# Patient Record
Sex: MALE | Race: AMERICAN INDIAN OR ALASKA NATIVE | NOT HISPANIC OR LATINO | URBAN - METROPOLITAN AREA
[De-identification: names, ages, dates, MRNs, and addresses within clinical notes are randomized per-mention and may not be internally consistent; named-entity substitution may affect disease eponyms.]

---

## 2022-12-21 ENCOUNTER — TRANSFERRED RECORDS (OUTPATIENT)
Dept: HEALTH INFORMATION MANAGEMENT | Facility: CLINIC | Age: 37
End: 2022-12-21

## 2022-12-22 ENCOUNTER — HOSPITAL ENCOUNTER (INPATIENT)
Facility: HOSPITAL | Age: 37
LOS: 11 days | Discharge: PSYCHIATRIC OR SUBSTANCE USE FACILITY | End: 2023-01-02
Attending: PSYCHIATRY & NEUROLOGY | Admitting: STUDENT IN AN ORGANIZED HEALTH CARE EDUCATION/TRAINING PROGRAM
Payer: COMMERCIAL

## 2022-12-22 DIAGNOSIS — F25.1 SCHIZOAFFECTIVE DISORDER, DEPRESSIVE TYPE (H): Primary | ICD-10-CM

## 2022-12-22 PROCEDURE — 250N000013 HC RX MED GY IP 250 OP 250 PS 637: Performed by: PSYCHIATRY & NEUROLOGY

## 2022-12-22 PROCEDURE — 250N000013 HC RX MED GY IP 250 OP 250 PS 637: Performed by: NURSE PRACTITIONER

## 2022-12-22 PROCEDURE — 99222 1ST HOSP IP/OBS MODERATE 55: CPT | Performed by: NURSE PRACTITIONER

## 2022-12-22 PROCEDURE — 124N000001 HC R&B MH

## 2022-12-22 RX ORDER — OLANZAPINE 10 MG/2ML
10 INJECTION, POWDER, FOR SOLUTION INTRAMUSCULAR 3 TIMES DAILY PRN
Status: DISCONTINUED | OUTPATIENT
Start: 2022-12-22 | End: 2023-01-02 | Stop reason: HOSPADM

## 2022-12-22 RX ORDER — OLANZAPINE 10 MG/1
10 TABLET ORAL 3 TIMES DAILY PRN
Status: DISCONTINUED | OUTPATIENT
Start: 2022-12-22 | End: 2023-01-02 | Stop reason: HOSPADM

## 2022-12-22 RX ORDER — OLANZAPINE 20 MG/1
20 TABLET ORAL AT BEDTIME
Status: ON HOLD | COMMUNITY
End: 2022-12-29

## 2022-12-22 RX ORDER — IBUPROFEN 400 MG/1
400 TABLET, FILM COATED ORAL EVERY 6 HOURS PRN
Status: DISCONTINUED | OUTPATIENT
Start: 2022-12-22 | End: 2022-12-23

## 2022-12-22 RX ORDER — BENZTROPINE MESYLATE 2 MG/1
TABLET ORAL
Status: ON HOLD | COMMUNITY
End: 2022-12-29

## 2022-12-22 RX ORDER — CHOLECALCIFEROL (VITAMIN D3) 50 MCG
1 TABLET ORAL AT BEDTIME
Status: ON HOLD | COMMUNITY
End: 2022-12-29

## 2022-12-22 RX ORDER — PROPRANOLOL HYDROCHLORIDE 20 MG/1
20 TABLET ORAL AT BEDTIME
Status: ON HOLD | COMMUNITY
End: 2022-12-29

## 2022-12-22 RX ORDER — DIPHENHYDRAMINE HCL 50 MG
50 CAPSULE ORAL AT BEDTIME
Status: ON HOLD | COMMUNITY
End: 2022-12-29

## 2022-12-22 RX ORDER — FERROUS SULFATE 325(65) MG
325 TABLET, DELAYED RELEASE (ENTERIC COATED) ORAL AT BEDTIME
Status: ON HOLD | COMMUNITY
End: 2022-12-29

## 2022-12-22 RX ADMIN — OLANZAPINE 10 MG: 10 TABLET, FILM COATED ORAL at 20:18

## 2022-12-22 RX ADMIN — IBUPROFEN 400 MG: 400 TABLET ORAL at 20:19

## 2022-12-22 ASSESSMENT — ACTIVITIES OF DAILY LIVING (ADL)
CHANGE_IN_FUNCTIONAL_STATUS_SINCE_ONSET_OF_CURRENT_ILLNESS/INJURY: NO
DRESSING/BATHING_DIFFICULTY: NO
ADLS_ACUITY_SCORE: 41
ADLS_ACUITY_SCORE: 43
ADLS_ACUITY_SCORE: 31
ADLS_ACUITY_SCORE: 31
ORAL_HYGIENE: INDEPENDENT
TOILETING_ISSUES: NO
DOING_ERRANDS_INDEPENDENTLY_DIFFICULTY: NO
HYGIENE/GROOMING: INDEPENDENT
DRESS: SCRUBS (BEHAVIORAL HEALTH);INDEPENDENT
HEARING_DIFFICULTY_OR_DEAF: NO
DIFFICULTY_COMMUNICATING: NO
FALL_HISTORY_WITHIN_LAST_SIX_MONTHS: NO
ADLS_ACUITY_SCORE: 31
DIFFICULTY_EATING/SWALLOWING: NO
WALKING_OR_CLIMBING_STAIRS_DIFFICULTY: NO
VISION_MANAGEMENT: GLASSES
WEAR_GLASSES_OR_BLIND: YES
CONCENTRATING,_REMEMBERING_OR_MAKING_DECISIONS_DIFFICULTY: YES

## 2022-12-22 NOTE — CARE PLAN
Prior to Admission Medication Reconciliation:     Medications added:   [] None  [x] As listed below:    All meds in PTA meds    Medications deleted:   [x] None  [] As listed below:    Medications marked for review/removal by attending:  [x] None  [] As listed below:    Changes made to existing medications:   [x] None  [] Updated time of day, strengths and frequencies to most current.     Pertinent notes/medications patient takes different than prescribed:     Patient reports he last took all meds 4-5 days ago. Today in the ED at the transferring facility he was given Olanzapine and propranolol doses.       Patient's medical facility and pharmacy are closed until Tuesday. PTA med review completed with pt personal  bottles and patient report only.     Last times/dates taken verified with patient:  [x] Yes- completed myself   [] Nurse completed, no changes made (double checked entries)  [] Unable to review with patient at this time:    Allergy review:    [x]Did not review: reviewed by nursing  []Allergies reviewed and updated if needed.     Medication reconciliation sources:   [x]Patient: patient listed off medications and confirmed taking as prescribed. Did not know names of cogentin or zyprexa but knew what they were for.   []Patient family member/emergency contact: **  [][facility] Report Review  [x]Epic Chart Review  [x]Care Everywhere review  [x]Pharmacy med list/phone call: [facility]- pharmacy and clinic closed until Tuesday 12/27/22  []Fill dates reflect compliancy. No concerns.  []Fill dates do not reflect compliancy on the following medications:   []Outside meds dispense report:   []Fill dates reflect compliancy. No concerns.  []Fill dates do not reflect compliancy on the following medications:   []HomeCare medlist, Nursing home or Assisted Living MAR:  []Behavioral Health Provider:  [x]Other: medication bottles patient had in personal belongings    Cogentin 2 mg 1 tab AM, 2 tabs HS 2  bottles    Propranolol 20 mg daily 2 bottles    Olanzapine 20 mg at bedtime 11/9/22 30 tabs    Benadryl 50 mg at bedtime 11/9/22 30 units    Ferrous sulfate 325 mg daily 11/9/22 30 units    Vit D 50 mcg daily 11/9/22 30 units     Pharmacy desired at discharge: [facility]    Is patient on coumadin?   [x]No  []Yes      Fill dates and reported compliancy:  [x]  Pt reports recent non-compliancy.   [] Not applicable. Patient is not taking any prescribed maintenance medications at this time.   [] Fill dates do not coincide with compliancy, but reports compliancy.    [] Fill dates reflect compliancy, but reports non-compliancy.   [] Cannot assess at this time.     Historian accuracy:  [] Excellent- alert and oriented, understands why meds were prescribed and how to take, able to answer specifics  [x] Good- alert and oriented, understands why meds were prescribed and how to take, some confusion   [] Fair- alert and oriented, doesn't know medications without list, cannot answer specifics about medications, but has a decent process for which to take at home  [] Poor- does not know medications, may not have a process to take at home, may be cognitively unable to review at this time  []Medication management done by family member or facility, no concerns about historian accuracy.   [] Cannot assess at this time.     Medication Management:  [x] Manages meds independently  [] Family member/ other party manages meds/assists:  [] Meds managed by staff at facility  [] Meds set up by home care, family/other party helps administer  [] Meds set up by home care, self administers  [] Cannot assess at this time.     Other medications aside from PTA:  [x] Denies taking any other medications aside from those listed in PTA meds, this includes over-the-counter vitamins, supplements and analgesics.   [] Unable to confirm at this time.     Hermelinda Sosa on 12/22/2022 at 2:30 PM     Notifying appropriate party of  changes/additions/discrepancies:  []No pertinent changes made, notification not necessary.   [] Notified attending provider via text page/phone call/sticky note or other:  [] Notified other:  [x] Medications have not been reconciled by a provider yet, notification not necessary  [] Pt is not admitted to floor yet or patient is boarding, PTA meds completed before admission.     Medications Prior to Admission   Medication Sig Dispense Refill Last Dose     benztropine (COGENTIN) 2 MG tablet Take 1 tablet (2 mg) by mouth once daily in the morning, Take 2 tablets (4 mg) at bedtime.   Past Week at 4-5 days ago     diphenhydrAMINE (BENADRYL) 50 MG capsule Take 50 mg by mouth At Bedtime   Past Week at 4-5 days ago     ferrous sulfate (FE TABS) 325 (65 Fe) MG EC tablet Take 325 mg by mouth At Bedtime   Past Week at 4-5 days ago     medical cannabis (Patient's own supply) Take as directed by prescribing facility.   Past Week at 2-3 days ago     OLANZapine (ZYPREXA) 20 MG tablet Take 20 mg by mouth At Bedtime   Past Week at 4-5 days ago     propranolol (INDERAL) 20 MG tablet Take 20 mg by mouth At Bedtime   Past Week at 4-5 days ago     vitamin D3 (CHOLECALCIFEROL) 50 mcg (2000 units) tablet Take 1 tablet by mouth At Bedtime   Past Week at 4-5 days ago
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
## 2022-12-22 NOTE — PROGRESS NOTES
"   12/22/22 1537   Patient Belongings   Did you bring any home meds/supplements to the hospital?  Yes   Disposition of meds  Sent to security/pharmacy per site process   Patient Belongings remains with patient;locker;sent to security per site process   Patient Belongings Remaining with Patient body jewelry;earrings;glasses   Patient Belongings Put in Hospital Secure Location (Security or Locker, etc.) cash/credit card;bracelet;cell phone/electronics;clothing;medical/assistive equipment;medication(s);necklace;shoes   Belongings Search Yes   Clothing Search Yes   Second Staff Charlie   Comment cane, blue tank top, blue underwear, colorful socks, flannel pants, flannel lined jeans, belt, black anf gray ripped sweatshirt, gray long sleeve shirt, boots, 2 lighters, gray UA jacket, feather tip writing kit, black milacs sweatshirt, black back pack, [place] zip up, flashlight, blue folder with all court/legal documents (wouldnt fit to go to safe), gray and black hoody, blue head phones, beaded cross necklace, reddish sweatshirt, remaining with patient : glasses, 2 earrings, eye brow ring     List items sent to safe: [place] card, very smashed Moprise ipad, 12grand casino cards, holiday gift card, 2 coins in a black box, covid vaccine card, Rx card, 2 net spend cards, pay pal card, jesús visa debit, sesame mastercard, [facility] bank debit card, walmart mastercard, [facility] bank visa debit, [facility] LEWIS card, current visa card, discover card, prepaid visa debit, [place] debit, ebay card, walgreens card, kemal yan mastercard, 3 visa cards with different names, copy of $890 check, [place] enrollment document, \"her beast\" bracelet, blue air pods, white earbuds    SECURITY WAS GIVEN A POCKET KNIFE AND MARIJUANA PIPE    All other belongings put in assigned cubby in belongings room.       I have reviewed my belongings list on admission and verify that it is correct.     Patient signature_______________________________    [facility]" witness (if patient unable to sign) ______________________________       I have received all my belongings at discharge.    Patient signature________________________________    Viri Palafox  12/22/2022  3:42 PM

## 2022-12-22 NOTE — PLAN OF CARE
ADMISSION NOTE    Reason for admission auditory and visual hallucinations telling him to harm self..  Safety concerns fall risk.  Risk for or history of violence risk for.   Full skin assessment: completed, no open areas, brusies, patient has a skin condition phoresis.     Patient arrived on unit from [facility] accompanied by security and EMS on 12/22/2022  1421 PM.   Status on arrival: alert, calm, cooperative, orientated.   Patient given tour of unit and Welcome to  unit papers given to patient, wanding completed, belongings inventoried, and admission assessment completed.   Patient's legal status on arrival is voluntary. Appropriate legal rights discussed with and copy given to patient. Patient Bill of Rights discussed with and copy given to patient.   Patient denies SI, HI, and thoughts of self harm and contracts for safety while on unit.      Charlie Bennett RN  12/22/2022  2:53 PM

## 2022-12-23 LAB
ALBUMIN SERPL BCG-MCNC: 3.8 G/DL (ref 3.5–5.2)
ALP SERPL-CCNC: 95 U/L (ref 40–129)
ALT SERPL W P-5'-P-CCNC: 18 U/L (ref 10–50)
ANION GAP SERPL CALCULATED.3IONS-SCNC: 12 MMOL/L (ref 7–15)
AST SERPL W P-5'-P-CCNC: 21 U/L (ref 10–50)
BILIRUB SERPL-MCNC: 0.7 MG/DL
BUN SERPL-MCNC: 10.9 MG/DL (ref 6–20)
CALCIUM SERPL-MCNC: 9.3 MG/DL (ref 8.6–10)
CHLORIDE SERPL-SCNC: 103 MMOL/L (ref 98–107)
CREAT SERPL-MCNC: 1.22 MG/DL (ref 0.67–1.17)
DEPRECATED HCO3 PLAS-SCNC: 26 MMOL/L (ref 22–29)
GFR SERPL CREATININE-BSD FRML MDRD: 78 ML/MIN/1.73M2
GLUCOSE SERPL-MCNC: 95 MG/DL (ref 70–99)
HOLD SPECIMEN: NORMAL
HOLD SPECIMEN: NORMAL
POTASSIUM SERPL-SCNC: 3.5 MMOL/L (ref 3.4–5.3)
PROT SERPL-MCNC: 7.2 G/DL (ref 6.4–8.3)
SODIUM SERPL-SCNC: 141 MMOL/L (ref 136–145)

## 2022-12-23 PROCEDURE — 250N000013 HC RX MED GY IP 250 OP 250 PS 637: Performed by: PSYCHIATRY & NEUROLOGY

## 2022-12-23 PROCEDURE — 124N000001 HC R&B MH

## 2022-12-23 PROCEDURE — 80053 COMPREHEN METABOLIC PANEL: CPT | Performed by: NURSE PRACTITIONER

## 2022-12-23 PROCEDURE — 250N000013 HC RX MED GY IP 250 OP 250 PS 637: Performed by: NURSE PRACTITIONER

## 2022-12-23 PROCEDURE — 99223 1ST HOSP IP/OBS HIGH 75: CPT | Mod: AI | Performed by: PSYCHIATRY & NEUROLOGY

## 2022-12-23 PROCEDURE — 36415 COLL VENOUS BLD VENIPUNCTURE: CPT | Performed by: NURSE PRACTITIONER

## 2022-12-23 RX ORDER — OLANZAPINE 10 MG/1
10 TABLET ORAL AT BEDTIME
Status: DISCONTINUED | OUTPATIENT
Start: 2022-12-23 | End: 2022-12-25

## 2022-12-23 RX ORDER — OLANZAPINE 2.5 MG/1
2.5 TABLET, FILM COATED ORAL 2 TIMES DAILY PRN
Status: DISCONTINUED | OUTPATIENT
Start: 2022-12-23 | End: 2022-12-30

## 2022-12-23 RX ORDER — IBUPROFEN 600 MG/1
600 TABLET, FILM COATED ORAL EVERY 6 HOURS PRN
Status: DISCONTINUED | OUTPATIENT
Start: 2022-12-23 | End: 2022-12-23

## 2022-12-23 RX ORDER — IBUPROFEN 400 MG/1
400 TABLET, FILM COATED ORAL EVERY 6 HOURS PRN
Status: DISCONTINUED | OUTPATIENT
Start: 2022-12-23 | End: 2022-12-29

## 2022-12-23 RX ADMIN — OLANZAPINE 10 MG: 10 TABLET, FILM COATED ORAL at 20:20

## 2022-12-23 RX ADMIN — IBUPROFEN 400 MG: 400 TABLET ORAL at 20:20

## 2022-12-23 ASSESSMENT — ACTIVITIES OF DAILY LIVING (ADL)
ADLS_ACUITY_SCORE: 31
HYGIENE/GROOMING: INDEPENDENT
ADLS_ACUITY_SCORE: 31
ADLS_ACUITY_SCORE: 31
ORAL_HYGIENE: INDEPENDENT
DRESS: SCRUBS (BEHAVIORAL HEALTH);INDEPENDENT
ADLS_ACUITY_SCORE: 31

## 2022-12-23 NOTE — PLAN OF CARE
Face to face shift report received from nurse. Rounding completed, pt observed.    Problem: Adult Behavioral Health Plan of Care  Goal: Patient-Specific Goal (Individualization)  Patient will sleep 5-8 hours a night.   Patient will be medication and treatment team compliant.   Patient will be free from falls and injury during hospitalization..  12/23/2022 1431 by Charlie Bennett RN  Outcome: Progressing  Patient has been calm and cooperative through the shift.  Patient mostly keeps to himself, comes out for meals and reads a book most of the day.    Problem: Suicidal Behavior  Goal: Suicidal Behavior is Absent or Managed  12/23/2022 1431 by Charlie Bennett RN  Outcome: Progressing     Face to face report will be communicated to oncoming RN.    Charlie POLO. RANDY Bennett  12/23/2022

## 2022-12-23 NOTE — PLAN OF CARE
"Social Service Psychosocial Assessment  Presenting Problem:   Patient came into the ED with psychosis, command AH/VH telling him to harm or kill himself.  Marital Status:     Spouse / Children:    3 Kids who live in [place] with their mom - he talks to them every so often  Psychiatric TX HX:   He reports he has been hospitalized in the past, last suicide attempt was 3 years ago, last time cutting was 2 years ago.  He does have many scars on his arms from cutting.  He does report that he has heard voices most of his life - everyone thought it was his \"invisible friends\".  He states it became a problem when he was [age].  He reports he gets his services through the [facility] - states he has been with is med provider for about a year and has never met with him -just gets refills.  Thinks he may need more services.  Suicide Risk Assessment:  He reports having some SI on admit and has a history of suicide attempts - last attempt was 3 years ago.  He denies having any SI today.  Access to Lethal Means (explain):   Denies  Family Psych HX:   Unknown  A & Ox:   x3  Medication Adherence:   Unknown  Medical Issues:   See H&P  Visual -Motor Functioning:   Good  Communication Skills /Needs:   Good  Ethnicity:    or      Spirituality/Gnosticist Affiliation:   None reported   Clergy Request:   No   History:   None  Living Situation:   Currently lives with his mom in [place].  Reports he has been living with her for a while - reports she is not a great support for him and they do not always get along well.  ADL s:  Independent   Education:  Graduated HS  Financial Situation:   Is applying for Disability - reports he currently does not have an income  Occupation:  Reports he has done many jobs in the past.  Leisure & Recreation:  Unknown  Childhood History:   Reports he was born in [place] - grew up with mom, aunts, uncles, cousin - moved to [place] when he was 9.  Has one 1/2 " brother.  Reports he moved around a lot as a kid.  Also came to [place] off and on until 2006 when he moved here.  Trauma Abuse HX:   None reported  Relationship / Sexuality:   Denies  Substance Use/ Abuse:   Has cirrhosis from alcohol abuse.  Reports he smokes pot daily and drinks some alcohol - reports he does not think his chemical use is problematic at this point.  Chemical Dependency Treatment HX:   Reports he has gone to inpatient CD tx in 2016 - thought it was helpful.  Legal Issues:   Reports he is currently on probation for Sales and transporting - did about a year in prison and thinks his probation ends soon.  Significant Life Events:   None reported  Strengths:   Has some supports, is open to more serivces  Challenges /Limitation:   Hallucinations, med changes  Patient Support Contact (Include name, relationship, number, and summary of conversation):       Interventions:       Community-Based Programs - might benefit from [facility] worker    Medical/Dental Care - PCP?    Medication Management - sees someone through the [facility]    Individual Therapy - ?    Financial Assistance - applying for Disability     Suicide Risk Assessment -He reports having some SI on admit and has a history of suicide attempts - last attempt was 3 years ago.  He denies having any SI today.      High Risk Safety Plan - Talk to supports; Call crisis lines; Go to local ER if feeling suicidal.

## 2022-12-23 NOTE — PLAN OF CARE
"  Problem: Adult Behavioral Health Plan of Care  Goal: Patient-Specific Goal (Individualization)  Description: You can add care plan individualizations to a care plan. Examples of Individualization might be:  \"Parent requests to be called daily at 9am for status\", \"I have a hard time hearing out of my right ear\", or \"Do not touch me to wake me up as it startles me\".  Outcome: Progressing  Note:     1820 Patient is in his room and calm and cooperative. Patient states that he is a little depressed but denies suicidal thoughts. Patient admits to hearing voices and feeling anxious. Requests medication for both. Patient ate well for supper meal. Steady on his feet. Appropriate interaction with peers.     2130 Patient medicated with Zyprexa 10 mg po for voices and Ibuprofen 400 mg po for chronic back pain.    Face to face end of shift report communicated to 11-7 shift RN.     Vilma Fong RN  12/22/2022  9:49 PM           Goal Outcome Evaluation:                        "

## 2022-12-23 NOTE — PLAN OF CARE
"Report received from outgoing staff.    Problem: Adult Behavioral Health Plan of Care  Goal: Patient-Specific Goal (Individualization)  Description: You can add care plan individualizations to a care plan. Examples of Individualization might be:  \"Parent requests to be called daily at 9am for status\", \"I have a hard time hearing out of my right ear\", or \"Do not touch me to wake me up as it startles me\".  Outcome: Not Progressing     Problem: Suicidal Behavior  Goal: Suicidal Behavior is Absent or Managed  Outcome: Not Progressing    Rounds completed. Safety checks completed every 15 minutes throughout the night. Pt noted to be resting with eyes closed and easy resp. for 7 hours. No suicidal gestures or comments noted.    Pt was pleasant and cooperative with lab draw this morning.    Face to face end of shift report to be communicated to oncoming RN.     "

## 2022-12-23 NOTE — H&P
"  [facility] PSYCHIATRY  -  HISTORY AND PHYSICAL     ADMISSION DATA     Shalom Castaneda MRN# [id number]   Age: 37 year old YOB: 1985     Date of Admission: 12/22/2022  Primary Physician: No primary care provider on file.   Referral Area: Referral Area: Outside Emergency Department, [facility]        CHIEF COMPLAINT   Reason for Admit/Consult: Suicidal ideation or attempt / self harm and Psychosis / jazmin symptoms       HISTORY OF PRESENT ILLNESS   Shalom Castaneda is a 37 year old male, single,  ([place]) with a past psychiatric history notable for schizoaffective disorder. Currently living in [place], [place] with his mother, 2 dogs and 2 cats.  He is unemployed, last working 1 year ago at [employer] grounds.  Smoking 1/2 bowl of cannabis daily.  Off medications secondary to difficulties with refills. Self-presents to outside emergency department on a voluntary basis with worsening depressive symptoms, worsening command AVH instructing him to kill or harm himself. Patient was subsequently transferred and accepted for inpatient psychiatric hospitalization at [facility] for further safety and further stabilization     On psychiatric interview, he is met within his room. He is calm, pleasant and engaging. No barriers to participation noted. He is deemed a reliable historian. He reports he is in the hospital because \"my schizoaffective is acting up\".  He reports that he has been off his medications for some time as he has not been able to get refills. He reports he is not connected with his outpatient supports and is requesting to get back on medications. He reports both auditory and visual hallucinations. Reports seeing shadows that he can't make out. He reports auditory hallucinations of several voices that at times are telling him to harm or kill himself. He states that he has been on olanzapine in the past and it is helpful for " reducing the intensity and frequency of his voices. He reports he is smoking cannabis daily. He reports he drinks 2 cans of beer daily.   Reports his mood has been down more lately. He is worried that he will act on the voices.     Explained the side effects, benefits and complication of olanzapine. He gives verbal consent.      REVIEW OF PSYCHIATRIC SYSTEMS   I do not elicit symptoms consistent with jazmin, generalized anxiety, agoraphobia, social anxiety, panic disorder, OCD, ADHD, an eating disorder and pain     REVIEW OF MEDICAL SYSTEMS   14-point medical review of systems is negative other than noted in the HPI.     PSYCHIATRIC HISTORY   Prior psychiatric diagnoses: schizoaffective diosrder  Psychiatric Hospitalizations: denies  Chemical Dependency Treatments: yes  Prior Psychiatric Prescriber: denies  Prior/Current Therapist: denies  Past Psychotropic Medication Trials: he can't recall     FAMILY HISTORY   No family history on file.      SUBSTANCE USE HISTORY   History   Drug Use Not on file       Social History    Substance and Sexual Activity      Alcohol use: Not on file      History   Smoking Status     Not on file   Smokeless Tobacco     Not on file     2 cans of beer a day  Smokes cannabis daily        SOCIAL HISTORY   Social History     Socioeconomic History     Marital status:      Spouse name: Not on file     Number of children: Not on file     Years of education: Not on file     Highest education level: Not on file   Occupational History     Not on file   Tobacco Use     Smoking status: Not on file     Smokeless tobacco: Not on file   Substance and Sexual Activity     Alcohol use: Not on file     Drug use: Not on file     Sexual activity: Not on file   Other Topics Concern     Not on file   Social History Narrative     Not on file     Social Determinants of Health     Financial Resource Strain: Not on file   Food Insecurity: Not on file   Transportation Needs: Not on file   Physical Activity:  "Not on file   Stress: Not on file   Social Connections: Not on file   Intimate Partner Violence: Not on file   Housing Stability: Not on file     Lives with mom in [place], [place]       PAST MEDICAL HISTORY   No past medical history on file.    No past surgical history on file.    Amoxicillin, Clindamycin, and Penicillins     PHYSICAL EXAM   I have reviewed the physical exam as documented by the medical team (Valentin 12/22/22) and agree with findings and assessment and have no additional findings to add at this time.     VITALS   Vitals: /69   Pulse 63   Temp 97.3  F (36.3  C) (Temporal)   Resp 16   Ht 1.803 m (5' 11\")   Wt 72.4 kg (159 lb 9.6 oz)   SpO2 98%   BMI 22.26 kg/m       MENTAL STATUS EXAM   Appearance:  awake, alert, broken glasses, unshaven  Attitude:  cooperative  Eye Contact:  good  Mood:  depressed  Affect:  mood congruent  Speech:  clear, coherent  Psychomotor Behavior:  no evidence of tardive dyskinesia, dystonia, or tics  Thought Process:  logical and linear  Associations:  no loose associations  Thought Content:  auditory hallucinations present  Insight:  fair  Judgment:  fair  Oriented to:  time, person, and place  Attention Span and Concentration:  limited  Recent and Remote Memory:  intact  Gait and Station: Normal       LABS   No results found for this or any previous visit (from the past 24 hour(s)).      ASSESSMENT   Summary: Shalom Castaneda is a 37 year old male, single,  ([place]) with a past psychiatric history notable for schizoaffective disorder. Currently living in [place], [place] with his mother, 2 dogs and 2 cats.  He is unemployed, last working 1 year ago at [employer] grounds.  Smoking 1/2 bowl of cannabis daily.  Off medications secondary to difficulties with refills. Self-presents to outside emergency department on a voluntary basis with worsening depressive symptoms, worsening command AVH instructing him to kill or harm himself. Patient was subsequently " transferred and accepted for inpatient psychiatric hospitalization at [facility] for further safety and further stabilization     Etiology of patient's presentation today is consistent with schizoaffective disorder, depressed type. Presenting with SI in the setting of command AV. Also endorsing VH.  Off medications. Will resume olanzapine as this has helped him in the past. Will explore his connections to outpatient services. Voluntary but holdable.     Explained the side effects, benefits and complications of neuroleptic medications. Retains capacity to consent. Consents to initiation during hospitalization.        DIAGNOSTIC FORMULATION   #Schizoaffective Disorder, depressed type  #cannabis use disorder, severe  #alcohol abuse versus use disorder     PLAN   Admit patient to [facility], Location: Unit 5     Legal Status: None    Safety Assessment:    Behavioral Orders   Procedures     Code 1 - Restrict to Unit     Routine Programming     As clinically indicated     Status 15     Every 15 minutes.      Imminent risk of harm in a setting less restrictive than an inpatient psychiatric facility is determined to be medium to high.       PTA medications held:   -propranolol  -cogentin    PTA medications continued/changed:   -olanzapine     New medications initiated:   - none    Programming: Patient will be treated in a therapeutic milieu with appropriate individual and group therapies. Education will be provided on diagnoses, medications, and treatments.     Medical diagnoses:  Per medicine    Diagnostic studies and consultations:  No additional studies or tests recommended on admission.      Level of care required: Acute psychiatric hospitalization    Justification for hospitalization and estimated length of stay: reasons for hospitalization include potential safety risk to self or others within the last week, decreased functioning in outpatient setting and in  the setting of no outpatient management, need for highly structured inpatient management for stabilization of psychiatric symptoms, need for psychiatric medication initiation and stabilization.  Estimated length of stay is 4-7 days.      Total time: 80 minutes       ATTESTATION    Wade Jaime MD  [facility]   Psychiatry    Video Visit: Patient has given verbal consent for video visit?: Yes  Type of Service: video visit for mental health treatment  Reason for Video Visit: COVID-19 and [place]  Originating Site (patient location): [facility]  Distant Site (provider location): Remote Location  Mode of Communication: Video Conference via [employer]  Time of Service: Date: December 23, 2022 , Start: 08:00 end: 09:00

## 2022-12-23 NOTE — DISCHARGE INSTRUCTIONS
Behavioral Discharge Planning and Instructions    Summary:  Patient came into the ED with psychosis, command AH/VH telling him to harm or kill himself.    Main Diagnosis: #Schizoaffective Disorder, depressed type  #cannabis use disorder, severe  #alcohol abuse versus use disorder    Health Care Follow-up:     [facility]  PCP: Juliet Friday January 6th @ 1:00 PM   [address] [place] [address]  Phone: [phone]    Referrals:    [facility]  All IRTS under the umbrella  [facility]- [phone]  Fax: [phone]          Attend all scheduled appointments with your outpatient providers. Call at least 24 hours in advance if you need to reschedule an appointment to ensure continued access to your outpatient providers.     Major Treatments, Procedures and Findings:  You were provided with: a psychiatric assessment, assessed for medical stability, medication evaluation and/or management, group therapy, and milieu management    Symptoms to Report: feeling more aggressive, increased confusion, losing more sleep, mood getting worse, or thoughts of suicide    Early warning signs can include: increased depression or anxiety sleep disturbances increased thoughts or behaviors of suicide or self-harm  increased unusual thinking, such as paranoia or hearing voices    Safety and Wellness:  Take all medicines as directed.  Make no changes unless your doctor suggests them.      Follow treatment recommendations.  Refrain from alcohol and non-prescribed drugs.  If there is a concern for safety, call 911.    Resources:   Crisis Intervention: 860.727.5993 or 962-055-0264 (TTY: 484.412.3426).  Call anytime for help.  National [place] on Mental Illness (www.[place].ra[place]h.org): 403.378.5823 or 899-323-3865.  [place] Association for Children's Mental Health (www.macmh.org): 669.475.7530.  Alcoholics Anonymous (www.alcoholics-anonymous.org): Check your phone book for your local chapter.  Suicide Awareness Voices of Education  "(SAVE) (www.save.org): 541-369-RXBV (7283)  National Suicide Prevention Line (www.mentalhealthmn.org): 237-950-MFWZ (7172)  Mental Health Consumer/Survivor Network of [place] (www.mhcsn.net): 469.772.2023 or 820-817-7964  Mental Health Association of [place] (www.mentalhealth.org): 260.943.4735 or 889-352-2314  Self- Management and Recovery Training., SMART-- Toll free: 756.910.9721  www.Moodsnap  Text 4 Life: txt \"LIFE\" to 18035 for immediate support and crisis intervention  Crisis text line: Text \"[place]\" to 919751. Free, confidential, 24/7.  Crisis Intervention: 400.962.2802 or 214-497-8607. Call anytime for help.     General Medication Instructions:   See your medication sheet(s) for instructions.   Take all medicines as directed.  Make no changes unless your doctor suggests them.   Go to all your doctor visits.  Be sure to have all your required lab tests. This way, your medicines can be refilled on time.  Do not use any drugs not prescribed by your doctor.  Avoid alcohol.    Advance Directives:   Scanned document on file with [facility]? No scanned doc  Is document scanned? Pt states no documents  Honoring Choices Your Rights Handout: Informed and given  Was more information offered? Pt declined    The Treatment team has appreciated the opportunity to work with you. If you have any questions or concerns about your recent admission, you can contact the unit which can receive your call 24 hours a day, 7 days a week. They will be able to get in touch with a Provider if needed. The unit number is [phone] .  "

## 2022-12-23 NOTE — PLAN OF CARE
"  Problem: Adult Behavioral Health Plan of Care  Goal: Patient-Specific Goal (Individualization)  Description: You can add care plan individualizations to a care plan. Examples of Individualization might be:  \"Parent requests to be called daily at 9am for status\", \"I have a hard time hearing out of my right ear\", or \"Do not touch me to wake me up as it startles me\".  Outcome: Progressing  Note:     1700 Patient is alert and up on the unit. Pleasant and smiling patient denies having any thoughts of self harm or harm to others. Patient states he feels the medication that he was started on has helped him to feel somewhat better and keeping the voices down some. Denies having any current physical problems except for chronic back pain. Patient has not made any requests for any medications or other at this time.    2100 Patient has taken HS medications and gone to bed. Patient tells writer that he is feeling somewhat better but admits to continued voices. Patient has an animated facies and has been social and attending groups throughout the evening.    Face to face end of shift report communicated to 11-7 shift RN.     Vimla Fong RN  12/23/2022  9:04 PM          Problem: Suicidal Behavior  Goal: Suicidal Behavior is Absent or Managed  Outcome: Progressing      Goal Outcome Evaluation:    Plan of Care Reviewed With: patient                   "

## 2022-12-24 PROCEDURE — 124N000001 HC R&B MH

## 2022-12-24 PROCEDURE — 99232 SBSQ HOSP IP/OBS MODERATE 35: CPT | Mod: GT | Performed by: PSYCHIATRY & NEUROLOGY

## 2022-12-24 PROCEDURE — 250N000013 HC RX MED GY IP 250 OP 250 PS 637: Performed by: NURSE PRACTITIONER

## 2022-12-24 PROCEDURE — 250N000013 HC RX MED GY IP 250 OP 250 PS 637: Performed by: PSYCHIATRY & NEUROLOGY

## 2022-12-24 RX ADMIN — OLANZAPINE 10 MG: 10 TABLET, FILM COATED ORAL at 19:16

## 2022-12-24 RX ADMIN — IBUPROFEN 400 MG: 400 TABLET ORAL at 16:06

## 2022-12-24 ASSESSMENT — ACTIVITIES OF DAILY LIVING (ADL)
HYGIENE/GROOMING: INDEPENDENT
ADLS_ACUITY_SCORE: 31
LAUNDRY: UNABLE TO COMPLETE
ADLS_ACUITY_SCORE: 31
HYGIENE/GROOMING: INDEPENDENT
DRESS: SCRUBS (BEHAVIORAL HEALTH);INDEPENDENT
ADLS_ACUITY_SCORE: 31
ADLS_ACUITY_SCORE: 31
ORAL_HYGIENE: INDEPENDENT
ADLS_ACUITY_SCORE: 31
ADLS_ACUITY_SCORE: 31

## 2022-12-24 NOTE — PLAN OF CARE
"  Problem: Adult Behavioral Health Plan of Care  Goal: Patient-Specific Goal (Individualization)  Description: Patient will attend at least 50% of unit programming.    Patient will be compliant with medications, assessments, and treatment team recommendation.   Patient will sleep at least 5 hours per NOC.  Outcome: Progressing  Note: Report received from Lara PADILLA.  Rounding complete.  Patient observed in bed and asleep at start of shift.    Shift Summary:  Patient is cooperative with assessment.  Patient find scheduled olanzapine helpful.  He reports anxiety os \"not too bad\" and decreasing depression.  He believes taking the olanzapine at 2020 last night may have been too late as he has been tired and in bed most of the morning. He is requesting it be administered closer to 1900.  Message left for provider regarding this.  Patient is polite during interactions.  Eye contact is intermittent.  He is able to make his needs known.       Problem: Suicidal Behavior  Goal: Suicidal Behavior is Absent or Managed  Outcome: Progressing  Note: Patient had no noted self harm this shift.    Goal Outcome Evaluation:                        "

## 2022-12-24 NOTE — PLAN OF CARE
"Report received from outgoing staff.     Problem: Adult Behavioral Health Plan of Care  Goal: Patient-Specific Goal (Individualization)  Description: You can add care plan individualizations to a care plan. Examples of Individualization might be:  \"Parent requests to be called daily at 9am for status\", \"I have a hard time hearing out of my right ear\", or \"Do not touch me to wake me up as it startles me\".  Outcome: Not Progressing     Problem: Suicidal Behavior  Goal: Suicidal Behavior is Absent or Managed  Outcome: Not Progressing    Rounds completed. Safety checks completed every 15 minutes throughout the night. Pt noted to be resting with eyes closed and easy resp. for 7 hours. No suicidal gestures or comments noted.    Face to face end of shift report to be communicated to oncoming RN.    "

## 2022-12-25 PROCEDURE — 124N000001 HC R&B MH

## 2022-12-25 PROCEDURE — 250N000013 HC RX MED GY IP 250 OP 250 PS 637: Performed by: NURSE PRACTITIONER

## 2022-12-25 PROCEDURE — 250N000013 HC RX MED GY IP 250 OP 250 PS 637: Performed by: PSYCHIATRY & NEUROLOGY

## 2022-12-25 PROCEDURE — 99232 SBSQ HOSP IP/OBS MODERATE 35: CPT | Mod: GT | Performed by: PSYCHIATRY & NEUROLOGY

## 2022-12-25 RX ORDER — OLANZAPINE 7.5 MG/1
7.5 TABLET, FILM COATED ORAL AT BEDTIME
Status: DISCONTINUED | OUTPATIENT
Start: 2022-12-25 | End: 2022-12-29

## 2022-12-25 RX ADMIN — OLANZAPINE 7.5 MG: 7.5 TABLET ORAL at 19:09

## 2022-12-25 RX ADMIN — OLANZAPINE 10 MG: 10 TABLET, FILM COATED ORAL at 11:07

## 2022-12-25 RX ADMIN — IBUPROFEN 400 MG: 400 TABLET ORAL at 11:07

## 2022-12-25 RX ADMIN — IBUPROFEN 400 MG: 400 TABLET ORAL at 17:24

## 2022-12-25 ASSESSMENT — ACTIVITIES OF DAILY LIVING (ADL)
LAUNDRY: UNABLE TO COMPLETE
ADLS_ACUITY_SCORE: 31
DRESS: SCRUBS (BEHAVIORAL HEALTH)
HYGIENE/GROOMING: INDEPENDENT
HYGIENE/GROOMING: INDEPENDENT
ADLS_ACUITY_SCORE: 31
ADLS_ACUITY_SCORE: 31
ORAL_HYGIENE: INDEPENDENT
ADLS_ACUITY_SCORE: 31
ORAL_HYGIENE: INDEPENDENT
DRESS: SCRUBS (BEHAVIORAL HEALTH);INDEPENDENT
ADLS_ACUITY_SCORE: 31

## 2022-12-25 NOTE — PROGRESS NOTES
"  [facility] PSYCHIATRY  -  PROGRESS NOTE     ID   Name: Shalom Castaneda  MRN#: [id number]     SUBJECTIVE   Prior to interviewing the patient, I met with nursing and reviewed patient's clinical condition. We discussed clinical care both before and after the interview. I have reviewed the patient's clinical course by review of records including previous notes, labs, and vital signs.     Per nursing, the patient had the following behavioral events over the last 24-hours: none    On psychiatric interview, he is met within his room. He states he is \"okay\". He tolerated initiation of olanzapine last night. He denies any physical complaints, denies any stiffness, any change to gait. Discussed the importance of adequate diet as well as importance of monitoring of bowel movements. He reports the CAH linger and report they are less than admission. He reports fleeting SI. He states he did go to a group on coping skills and he is hopeful to attend another today.        MEDICATIONS   Scheduled Meds:    OLANZapine  10 mg Oral At Bedtime     PRN Meds:.ibuprofen, OLANZapine **OR** OLANZapine, OLANZapine     ALLERGIES   Allergies   Allergen Reactions     Amoxicillin Nausea and Vomiting and Itching     Clindamycin Nausea and Vomiting and Itching     Penicillins Nausea and Vomiting and Itching        VITALS   Vitals: /93   Pulse 86   Temp 97.9  F (36.6  C) (Temporal)   Resp 14   Ht 1.803 m (5' 11\")   Wt 72.4 kg (159 lb 9.6 oz)   SpO2 98%   BMI 22.26 kg/m       MENTAL STATUS EXAM   Appearance:  awake, alert, broken glasses, unshaven  Attitude:  cooperative  Eye Contact:  good  Mood:  depressed  Affect:  mood congruent  Speech:  clear, coherent  Psychomotor Behavior:  no evidence of tardive dyskinesia, dystonia, or tics  Thought Process:  logical and linear  Associations:  no loose associations  Thought Content:  auditory hallucinations present  Insight:  fair  Judgment:  fair  Oriented to:  time, person, and " place  Attention Span and Concentration:  limited  Recent and Remote Memory:  intact  Gait and Station: Normal       LABS   No results found for this or any previous visit (from the past 24 hour(s)).      ASSESSMENT   Summary: Shalom Castaneda is a 37 year old male, single,  ([place]) with a past psychiatric history notable for schizoaffective disorder. Currently living in [place], [place] with his mother, 2 dogs and 2 cats.  He is unemployed, last working 1 year ago at [employer] grounds.  Smoking 1/2 bowl of cannabis daily.  Off medications secondary to difficulties with refills. Self-presents to outside emergency department on a voluntary basis with worsening depressive symptoms, worsening command AVH instructing him to kill or harm himself. Patient was subsequently transferred and accepted for inpatient psychiatric hospitalization at [facility] for further safety and further stabilization     Daily Progress:  Adjusting to the unit without complication. Attending groups. Beginning to process the events that led up to his admission. Fleeting suicidal ideation remains. CAH remain present, he believes olanzapine helped yesterday - wishes to take at an earlier time this evening      DIAGNOSTIC FORMULATION   #Schizoaffective Disorder, depressed type  #cannabis use disorder, severe  #alcohol abuse versus use disorder     PLAN     Location: Unit 5  Legal Status: None    Safety Assessment:    Behavioral Orders   Procedures     Code 1 - Restrict to Unit     Routine Programming     As clinically indicated     Status 15     Every 15 minutes.        PTA medications held:   -propranolol  -cogentin     PTA medications continued/changed:   -olanzapine      New medications initiated:   - none    Today's Changes:  - change olanzapine to 19:00 instead of at bedtime.     Programming: Patient will be treated in a therapeutic milieu with appropriate individual and group therapies.  Education will be provided on diagnoses, medications, and treatments. Encouraged behavioral activation and participation in group programming.     Medical diagnoses:  Per medicine    Disposition: pending clinical course       TREATMENT TEAM CARE PLAN     Progress: Continued symptoms.    Continued Stay Criteria/Rationale: Continued symptoms without sufficient improvement/resolution.    Medical/Physical: See above.    Precautions: See above.     Plan: Continue inpatient care with unit support and medication management.    Rationale for change in precautions or plan: NA due to no change.    Participants: Wade Jaime MD, Nursing, SW, OT.    The patient's care was discussed with the treatment team and chart notes were reviewed.       ATTESTATION    Wade Jaime MD  [facility]   Psychiatry    Video Visit: Patient has given verbal consent for video visit?: Yes  Type of Service: video visit for mental health treatment  Reason for Video Visit: COVID-19 [place]  Originating Site (patient location): [facility]  Distant Site (provider location): Remote Location  Mode of Communication: Video Conference via [email or web address]  Time of Service: Date: December 24, 2022 , Start: 08:00 end: 08:30
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
## 2022-12-25 NOTE — PLAN OF CARE
Report received from outgoing staff.    Problem: Adult Behavioral Health Plan of Care  Goal: Patient-Specific Goal (Individualization)  Description: Patient will attend at least 50% of unit programming.    Patient will be compliant with medications, assessments, and treatment team recommendation.   Patient will sleep at least 5 hours per NOC.  Outcome: Not Progressing     Problem: Suicidal Behavior  Goal: Suicidal Behavior is Absent or Managed  Outcome: Not Progressing    Rounds completed. Safety checks completed every 15 minutes throughout the night. Pt noted to be resting with eyes closed and easy resp. for 7 hours. No suicidal gestures or comments noted.    Face to face end of shift report to be communicated to oncoming RN.

## 2022-12-25 NOTE — PLAN OF CARE
"  Problem: Adult Behavioral Health Plan of Care  Goal: Patient-Specific Goal (Individualization)  Description: Patient will attend at least 50% of unit programming.    Patient will be compliant with medications, assessments, and treatment team recommendation.   Patient will sleep at least 5 hours per NOC.  Note: Pt ate breakfast in lounge.  He is in a good mood today.  He did report feeling quite anxious, writer gave him 10 mg of Zyprexa for preventative care. He has full range affect.                Pt reported the Zyprexa working.  He held up his hands and said \"my hands aren't shaking anymore.\"  He said that it helped his AH quiet down as well, along with his anxiety. Pt is up on open unit.     Problem: Suicidal Behavior  Goal: Suicidal Behavior is Absent or Managed  Outcome: Progressing   Goal Outcome Evaluation:    Plan of Care Reviewed With: patient                   "

## 2022-12-26 PROCEDURE — 250N000013 HC RX MED GY IP 250 OP 250 PS 637: Performed by: STUDENT IN AN ORGANIZED HEALTH CARE EDUCATION/TRAINING PROGRAM

## 2022-12-26 PROCEDURE — 250N000013 HC RX MED GY IP 250 OP 250 PS 637: Performed by: PSYCHIATRY & NEUROLOGY

## 2022-12-26 PROCEDURE — 124N000001 HC R&B MH

## 2022-12-26 PROCEDURE — 99232 SBSQ HOSP IP/OBS MODERATE 35: CPT | Mod: GT | Performed by: STUDENT IN AN ORGANIZED HEALTH CARE EDUCATION/TRAINING PROGRAM

## 2022-12-26 PROCEDURE — 250N000013 HC RX MED GY IP 250 OP 250 PS 637: Performed by: NURSE PRACTITIONER

## 2022-12-26 RX ORDER — GABAPENTIN 100 MG/1
100 CAPSULE ORAL 3 TIMES DAILY PRN
Status: DISCONTINUED | OUTPATIENT
Start: 2022-12-26 | End: 2023-01-01

## 2022-12-26 RX ADMIN — IBUPROFEN 400 MG: 400 TABLET ORAL at 16:09

## 2022-12-26 RX ADMIN — GABAPENTIN 100 MG: 100 CAPSULE ORAL at 21:35

## 2022-12-26 RX ADMIN — OLANZAPINE 2.5 MG: 2.5 TABLET, FILM COATED ORAL at 09:30

## 2022-12-26 RX ADMIN — IBUPROFEN 400 MG: 400 TABLET ORAL at 09:28

## 2022-12-26 RX ADMIN — GABAPENTIN 100 MG: 100 CAPSULE ORAL at 13:17

## 2022-12-26 RX ADMIN — GABAPENTIN 100 MG: 100 CAPSULE ORAL at 16:09

## 2022-12-26 RX ADMIN — OLANZAPINE 7.5 MG: 7.5 TABLET ORAL at 18:56

## 2022-12-26 RX ADMIN — IBUPROFEN 400 MG: 400 TABLET ORAL at 21:35

## 2022-12-26 ASSESSMENT — ACTIVITIES OF DAILY LIVING (ADL)
ORAL_HYGIENE: INDEPENDENT
ADLS_ACUITY_SCORE: 31
DRESS: SCRUBS (BEHAVIORAL HEALTH)
ADLS_ACUITY_SCORE: 31
ADLS_ACUITY_SCORE: 31
HYGIENE/GROOMING: INDEPENDENT
HYGIENE/GROOMING: INDEPENDENT
ADLS_ACUITY_SCORE: 31

## 2022-12-26 NOTE — PLAN OF CARE
Problem: Adult Behavioral Health Plan of Care  Goal: Patient-Specific Goal (Individualization)  Description: Patient will attend at least 50% of unit programming.    Patient will be compliant with medications, assessments, and treatment team recommendation.   Patient will sleep at least 5 hours per NOC.  Outcome: Progressing    A&O, VSS, chronic pain to lower back and L shoulder 8/10-Ibuprofen 400 mg per request rec'd.   Withdrawn and quiet demeanor little to no interactions with peers but talkative with staff 1:1.  Endorses auditory and visual hallucinations-states they are improved.   Denies thoughts of suicide, SIB or HI.     Problem: Suicidal Behavior  Goal: Suicidal Behavior is Absent or Managed  Outcome: Progressing   Goal Outcome Evaluation:    Plan of Care Reviewed With: patient        Face to face end of shift report communicated to oncmichael Fermin RN  12/25/2022  6:57 PM

## 2022-12-26 NOTE — PLAN OF CARE
Problem: Adult Behavioral Health Plan of Care  Goal: Patient-Specific Goal (Individualization)  Description: Patient will attend at least 50% of unit programming.    Patient will be compliant with medications, assessments, and treatment team recommendation.   Patient will sleep at least 5 hours per NOC.  Outcome: Progressing    A&O, VSS--BP elevated at 152/98 (manual) with HR 98. Pulse is bounding.   pain to L-shoulder and lower back 8/10 Ibuprofen 400 mg rec'd per request times two doses this shift.   Denies SI, SIB or HI. Endorses chronic auditory hallucinations--states they continue to improve. Endorses anxiety request Gabapentin 100 mg twice this shift.   Full range affect, pleasant demeanor with clear and linear conversations. Interactions with peers and staff appropriate.       Lola Fermin RN  12/26/2022  4:56 PM      Problem: Suicidal Behavior  Goal: Suicidal Behavior is Absent or Managed  Outcome: Progressing   Goal Outcome Evaluation:    Plan of Care Reviewed With: patient    Face to face end of shift report communicated to oncoming shift.     Lola Fermin RN  12/26/2022  5:01 PM

## 2022-12-26 NOTE — PROGRESS NOTES
"  [facility] PSYCHIATRY  -  PROGRESS NOTE     ID   Name: Shalom Castaneda  MRN#: [id number]     SUBJECTIVE   Prior to interviewing the patient, I met with nursing and reviewed patient's clinical condition. We discussed clinical care both before and after the interview. I have reviewed the patient's clinical course by review of records including previous notes, labs, and vital signs.     Per nursing, the patient had the following behavioral events over the last 24-hours: none    On psychiatric interview, he is met within his room. He state he is attending groups. He reports he is learning various coping skills. He states his mood is \"Better\". He reports that his AH are still present but much less. He states he feels safe in the hospital. He reports feeling tired, \"too tired\" in the morning still despite taking olanzapine earlier than the night prior. We discussed lowering the dose of olanzapine at bedtime and he is agreeable to this and will monitor for worsening voices, he is aware that he also has PRN olanzapine at 2.5 mg in the day if he needs to take for AH. Denies physical complaints. Denies stiffness.       MEDICATIONS   Scheduled Meds:    OLANZapine  7.5 mg Oral At Bedtime     PRN Meds:.ibuprofen, OLANZapine **OR** OLANZapine, OLANZapine     ALLERGIES   Allergies   Allergen Reactions     Amoxicillin Nausea and Vomiting and Itching     Clindamycin Nausea and Vomiting and Itching     Penicillins Nausea and Vomiting and Itching        VITALS   Vitals: /88   Pulse 97   Temp 98.9  F (37.2  C) (Temporal)   Resp 16   Ht 1.803 m (5' 11\")   Wt 74.8 kg (165 lb)   SpO2 97%   BMI 23.01 kg/m       MENTAL STATUS EXAM   Appearance:  awake, alert, broken glasses, unshaven  Attitude:  cooperative  Eye Contact:  good  Mood:  \"better\"  Affect:  mood congruent  Speech:  clear, coherent  Psychomotor Behavior:  no evidence of tardive dyskinesia, dystonia, or tics  Thought Process:  logical and linear  Associations: "  no loose associations  Thought Content:  auditory hallucinations present  Insight:  fair  Judgment:  fair  Oriented to:  time, person, and place  Attention Span and Concentration:  limited  Recent and Remote Memory:  intact  Gait and Station: Normal       LABS   No results found for this or any previous visit (from the past 24 hour(s)).      ASSESSMENT   Summary: Shalom Castaneda is a 37 year old male, single,  ([place]) with a past psychiatric history notable for schizoaffective disorder. Currently living in [place], [place] with his mother, 2 dogs and 2 cats.  He is unemployed, last working 1 year ago at [employer].  Smoking 1/2 bowl of cannabis daily.  Off medications secondary to difficulties with refills. Self-presents to outside emergency department on a voluntary basis with worsening depressive symptoms, worsening command AVH instructing him to kill or harm himself. Patient was subsequently transferred and accepted for inpatient psychiatric hospitalization at [facility] for further safety and further stabilization     Daily Progress:  Doing well. Attending groups. Reporting decrease in his AH. Reports continued sedation in AM from olanzapine despite taking it earlier in the evening last night. Will lower olanzapine to 7.5 mg at bedtime tonight , will observe for worsening AH. He would like to go to an IRTS     DIAGNOSTIC FORMULATION   #Schizoaffective Disorder, depressed type  #cannabis use disorder, severe  #alcohol abuse versus use disorder     PLAN     Location: Unit 5  Legal Status: Orders Placed This Encounter      Legal status Voluntary    Safety Assessment:    Behavioral Orders   Procedures     Code 1 - Restrict to Unit     Routine Programming     As clinically indicated     Status 15     Every 15 minutes.        PTA medications held:   -propranolol  -cogentin     PTA medications continued/changed:   -olanzapine      New medications initiated:    - none    Today's Changes:  -lower olanzapine to 7.5 mg at bedtime 2/2 oversedation in AM - monitor for worsening AH    Programming: Patient will be treated in a therapeutic milieu with appropriate individual and group therapies. Education will be provided on diagnoses, medications, and treatments. Encouraged behavioral activation and participation in group programming.     Medical diagnoses:  Per medicine    Disposition: pending clinical course       ATTESTATION    Wade Jaime MD  [facility]   Psychiatry    Video Visit: Patient has given verbal consent for video visit?: Yes  Type of Service: video visit for mental health treatment  Reason for Video Visit: COVID-19 [place]  Originating Site (patient location): [facility]  Distant Site (provider location): Remote Location  Mode of Communication: Video Conference via [employer]  Time of Service: Date: December 25 2022 , Start: 08:00 end: 08:30

## 2022-12-26 NOTE — PLAN OF CARE
Problem: Adult Behavioral Health Plan of Care  Goal: Patient-Specific Goal (Individualization)  Description: Patient will attend at least 50% of unit programming.    Patient will be compliant with medications, assessments, and treatment team recommendation.   Patient will sleep at least 5 hours per NOC.  Outcome: Progressing     Problem: Suicidal Behavior  Goal: Suicidal Behavior is Absent or Managed  Outcome: Progressing   Goal Outcome Evaluation:         Pt cooperative and pleasant. States he has chronic auditory and visual hallucinations. Requested and receive Zyprexa 2.5 mg po and Advil 400 mg po c/o back and shoulder pains.Slept in a little late today . Ate a good breakfast. Somewhat withdrawn when around peers.   1317- medicated with Gabapentin 100 mg po per request of anxiety  Face to face end of shift report communicated to RANDY Jaffe RN  12/26/2022  9:57 AM

## 2022-12-26 NOTE — PROGRESS NOTES
"  [facility] PSYCHIATRY  -  PROGRESS NOTE     ID   Name: Shalom Castaneda  MRN#: [id number]     SUBJECTIVE   Prior to interviewing the patient, I met with nursing and reviewed patient's clinical condition. We discussed clinical care both before and after the interview. I have reviewed the patient's clinical course by review of records including previous notes, labs, and vital signs.     Per nursing, the patient had the following behavioral events over the last 24-hours: none. Slept overnight. No safety concerns.    On psychiatric interview, he is met within his room. He notes that he is doing pretty well today. He plans to go to groups. He notes that he is learning some coping skills. The patient notes that he is feeling less sedated from Zyprexa. He notes that his \"body feels tired.\" Otherwise, he is doing fine. He notes some anxiety regarding the auditory hallucinations. He notes that his hallucinations are not as loud and bothersome. He denies any CAH. No SI.     The patient notes that he likes to read and write for fun. He is going through a [email or web address] book. He denies any trouble reading.     The patient denies any headache, confusion, change in vision, chest pain, SOB, abdominal pain, diarrhea, or constipation. No medical concerns today.       MEDICATIONS   Scheduled Meds:    OLANZapine  7.5 mg Oral At Bedtime     PRN Meds:.ibuprofen, OLANZapine **OR** OLANZapine, OLANZapine     ALLERGIES   Allergies   Allergen Reactions     Amoxicillin Nausea and Vomiting and Itching     Clindamycin Nausea and Vomiting and Itching     Penicillins Nausea and Vomiting and Itching        VITALS   Vitals: /87   Pulse 78   Temp 97.1  F (36.2  C) (Temporal)   Resp 16   Ht 1.803 m (5' 11\")   Wt 74.8 kg (165 lb)   SpO2 98%   BMI 23.01 kg/m       MENTAL STATUS EXAM   Appearance:  awake, alert, broken glasses, unshaven  Attitude:  cooperative  Eye Contact:  good  Mood:  \"Good\"  Affect:  mood congruent  Speech:  clear, " coherent  Psychomotor Behavior:  no evidence of tardive dyskinesia, dystonia, or tics  Thought Process:  logical and linear  Associations:  no loose associations  Thought Content:  auditory hallucinations present, reduced, denies CAH  Insight:  fair  Judgment:  fair  Oriented to:  time, person, and place  Attention Span and Concentration:  limited  Recent and Remote Memory:  intact  Gait and Station: Normal       LABS   No results found for this or any previous visit (from the past 24 hour(s)).      ASSESSMENT   Summary: Shalom Castaneda is a 37 year old male, single,  ([place]) with a past psychiatric history notable for schizoaffective disorder. Currently living in [place], [place] with his mother, 2 dogs and 2 cats.  He is unemployed, last working 1 year ago at [employer] grounds.  Smoking 1/2 bowl of cannabis daily.  Off medications secondary to difficulties with refills. Self-presents to outside emergency department on a voluntary basis with worsening depressive symptoms, worsening command AVH instructing him to kill or harm himself. Patient was subsequently transferred and accepted for inpatient psychiatric hospitalization at [facility] for further safety and further stabilization     Daily Progress:  Doing well. Attending groups. Reporting decrease in his AH. Reduced Zyprexa to address sedation without worsening of hallucinations seen. Improvement in sedation seen. Will explore if IRTS is possible.       DIAGNOSTIC FORMULATION   #Schizoaffective Disorder, depressed type  #cannabis use disorder, severe  #alcohol abuse versus use disorder     PLAN     Location: Unit 5  Legal Status: Orders Placed This Encounter      Legal status Voluntary    Safety Assessment:    Behavioral Orders   Procedures     Code 1 - Restrict to Unit     Routine Programming     As clinically indicated     Status 15     Every 15 minutes.        PTA medications held:    -propranolol  -cogentin     PTA medications continued/changed:   -olanzapine 10 mg at bedtime -> 7.5 mg at bedtime      New medications initiated:   -none    Today's Changes:  -None    Programming: Patient will be treated in a therapeutic milieu with appropriate individual and group therapies. Education will be provided on diagnoses, medications, and treatments. Encouraged behavioral activation and participation in group programming.     Medical diagnoses:  Per medicine    Disposition: pending clinical course       TREATMENT TEAM CARE PLAN     Progress: Continued symptoms with some small improvement    Continued Stay Criteria/Rationale: Continued symptoms without sufficient improvement/resolution.    Medical/Physical: See above.    Precautions: See above.     Plan: Continue inpatient care with unit support and medication management.    Rationale for change in precautions or plan: NA due to no change.    Participants: Camilo Dai DO, Nursing, SW, OT.    The patient's care was discussed with the treatment team and chart notes were reviewed.       ATTESTATION    Camilo Dai DO, MA  Psychiatrist     Video Visit: Patient has given verbal consent for video visit?: Yes  Type of Service: video visit for mental health treatment  Reason for Video Visit: COVID-19 [place] access [place]  Originating Site (patient location): [facility]  Distant Site (provider location): Remote Location  Mode of Communication: Video Conference via [employer]  Time of Service: Date: 12/26/2022 Start: 1045 end: 1100

## 2022-12-27 PROCEDURE — 250N000013 HC RX MED GY IP 250 OP 250 PS 637: Performed by: NURSE PRACTITIONER

## 2022-12-27 PROCEDURE — 250N000013 HC RX MED GY IP 250 OP 250 PS 637: Performed by: PSYCHIATRY & NEUROLOGY

## 2022-12-27 PROCEDURE — 99232 SBSQ HOSP IP/OBS MODERATE 35: CPT | Mod: GT | Performed by: STUDENT IN AN ORGANIZED HEALTH CARE EDUCATION/TRAINING PROGRAM

## 2022-12-27 PROCEDURE — 124N000001 HC R&B MH

## 2022-12-27 PROCEDURE — 250N000013 HC RX MED GY IP 250 OP 250 PS 637: Performed by: STUDENT IN AN ORGANIZED HEALTH CARE EDUCATION/TRAINING PROGRAM

## 2022-12-27 RX ORDER — PROPRANOLOL HYDROCHLORIDE 20 MG/1
20 TABLET ORAL AT BEDTIME
Status: DISCONTINUED | OUTPATIENT
Start: 2022-12-27 | End: 2023-01-02 | Stop reason: HOSPADM

## 2022-12-27 RX ADMIN — IBUPROFEN 400 MG: 400 TABLET ORAL at 21:55

## 2022-12-27 RX ADMIN — OLANZAPINE 10 MG: 10 TABLET, FILM COATED ORAL at 13:03

## 2022-12-27 RX ADMIN — OLANZAPINE 7.5 MG: 7.5 TABLET ORAL at 19:19

## 2022-12-27 RX ADMIN — PROPRANOLOL HYDROCHLORIDE 20 MG: 20 TABLET ORAL at 20:11

## 2022-12-27 RX ADMIN — IBUPROFEN 400 MG: 400 TABLET ORAL at 08:24

## 2022-12-27 RX ADMIN — GABAPENTIN 100 MG: 100 CAPSULE ORAL at 21:55

## 2022-12-27 RX ADMIN — IBUPROFEN 400 MG: 400 TABLET ORAL at 16:11

## 2022-12-27 RX ADMIN — GABAPENTIN 100 MG: 100 CAPSULE ORAL at 19:19

## 2022-12-27 RX ADMIN — GABAPENTIN 100 MG: 100 CAPSULE ORAL at 08:24

## 2022-12-27 ASSESSMENT — ACTIVITIES OF DAILY LIVING (ADL)
HYGIENE/GROOMING: INDEPENDENT
HYGIENE/GROOMING: INDEPENDENT
ADLS_ACUITY_SCORE: 31
ORAL_HYGIENE: INDEPENDENT
ADLS_ACUITY_SCORE: 31
DRESS: SCRUBS (BEHAVIORAL HEALTH)

## 2022-12-27 NOTE — PROGRESS NOTES
"  [facility] PSYCHIATRY  -  PROGRESS NOTE     ID   Name: Shalom Castaneda  MRN#: [id number]     SUBJECTIVE   Prior to interviewing the patient, I met with nursing and reviewed patient's clinical condition. We discussed clinical care both before and after the interview. I have reviewed the patient's clinical course by review of records including previous notes, labs, and vital signs.     Per nursing, the patient had the following behavioral events over the last 24-hours: none. Slept overnight. No safety concerns.    On psychiatric interview, he is met within in the hallway. He notes that the day is going fairly well. He is still having AH. They are better than they were before. He denies any worsening going down on a lower dose. He notes that mood is happy.     The patient denies any headache, confusion, change in vision, chest pain, SOB. He notes some stomach pain at times. He does not think he is constipated. He had a BM a couple of hours ago. No medical concerns today.       MEDICATIONS   Scheduled Meds:    OLANZapine  7.5 mg Oral At Bedtime     PRN Meds:.gabapentin, ibuprofen, OLANZapine **OR** OLANZapine, OLANZapine     ALLERGIES   Allergies   Allergen Reactions     Amoxicillin Nausea and Vomiting and Itching     Clindamycin Nausea and Vomiting and Itching     Penicillins Nausea and Vomiting and Itching        VITALS   Vitals: /89   Pulse 72   Temp 97.6  F (36.4  C) (Temporal)   Resp 16   Ht 1.803 m (5' 11\")   Wt 74.8 kg (165 lb)   SpO2 99%   BMI 23.01 kg/m       MENTAL STATUS EXAM   Appearance:  awake, alert, broken glasses, unshaven  Attitude:  cooperative  Eye Contact:  good  Mood:  \"Good\"  Affect:  mood congruent  Speech:  clear, coherent  Psychomotor Behavior:  no evidence of tardive dyskinesia, dystonia, or tics  Thought Process:  logical and linear  Associations:  no loose associations  Thought Content:  auditory hallucinations present, reduced, denies CAH  Insight:  fair  Judgment:  " fair  Oriented to:  time, person, and place  Attention Span and Concentration:  limited  Recent and Remote Memory:  intact  Gait and Station: Normal       LABS   No results found for this or any previous visit (from the past 24 hour(s)).      ASSESSMENT   Summary: Shalom Castaneda is a 37 year old male, single,  ([place]) with a past psychiatric history notable for schizoaffective disorder. Currently living in [place], [place] with his mother, 2 dogs and 2 cats.  He is unemployed, last working 1 year ago at [employer].  Smoking 1/2 bowl of cannabis daily.  Off medications secondary to difficulties with refills. Self-presents to outside emergency department on a voluntary basis with worsening depressive symptoms, worsening command AVH instructing him to kill or harm himself. Patient was subsequently transferred and accepted for inpatient psychiatric hospitalization at [facility] for further safety and further stabilization     Daily Progress:  Doing well. Attending groups. Reporting decrease in his AH. Reduced Zyprexa to address sedation without worsening of hallucinations seen. Improvement in sedation seen. Will explore if IRTS is possible.       DIAGNOSTIC FORMULATION   #Schizoaffective Disorder, depressed type  #cannabis use disorder, severe  #alcohol abuse versus use disorder     PLAN     Location: Unit 5  Legal Status: Orders Placed This Encounter      Legal status Voluntary    Safety Assessment:    Behavioral Orders   Procedures     Code 1 - Restrict to Unit     Routine Programming     As clinically indicated     Status 15     Every 15 minutes.        PTA medications held:   -propranolol  -cogentin     PTA medications continued/changed:   -olanzapine 10 mg at bedtime -> 7.5 mg at bedtime      New medications initiated:   -none    Today's Changes:  -None    Programming: Patient will be treated in a therapeutic milieu with appropriate individual and group  therapies. Education will be provided on diagnoses, medications, and treatments. Encouraged behavioral activation and participation in group programming.     Medical diagnoses:  Per medicine    Disposition: pending clinical course       ATTESTATION    Camilo Dai DO, MA  Psychiatrist     Video Visit: Patient has given verbal consent for video visit?: Yes  Type of Service: video visit for mental health treatment  Reason for Video Visit: COVID-19 [place]  Originating Site (patient location): [facility]  Distant Site (provider location): Remote Location  Mode of Communication: Video Conference via [employer]  Time of Service: Date: 12/27/2022 Start: 200 end: 215
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
## 2022-12-27 NOTE — PLAN OF CARE
Problem: Suicidal Behavior  Goal: Suicidal Behavior is Absent or Managed  Outcome: Progressing     Problem: Adult Behavioral Health Plan of Care  Goal: Patient-Specific Goal (Individualization)  Description: Patient will attend at least 50% of unit programming.    Patient will be compliant with medications, assessments, and treatment team recommendation.   Patient will sleep at least 5 hours per NOC.  Outcome: Progressing     Face to Face report received from Lola PERRY RN.  Rounding completed. Patient observed in bed with eyes closed appearing to sleep with resting respirations noted.  15 minute and PRN checks all night. No complaints offered. Will continue to monitor.     Face to face end of shift report communicated to [facility] day shift RN.     Cindi Arce RN  12/27/2022  3:14 AM

## 2022-12-28 PROCEDURE — 250N000013 HC RX MED GY IP 250 OP 250 PS 637: Performed by: PSYCHIATRY & NEUROLOGY

## 2022-12-28 PROCEDURE — 99232 SBSQ HOSP IP/OBS MODERATE 35: CPT | Mod: GT | Performed by: PSYCHIATRY & NEUROLOGY

## 2022-12-28 PROCEDURE — 250N000013 HC RX MED GY IP 250 OP 250 PS 637: Performed by: NURSE PRACTITIONER

## 2022-12-28 PROCEDURE — 250N000013 HC RX MED GY IP 250 OP 250 PS 637: Performed by: STUDENT IN AN ORGANIZED HEALTH CARE EDUCATION/TRAINING PROGRAM

## 2022-12-28 PROCEDURE — 124N000001 HC R&B MH

## 2022-12-28 RX ADMIN — IBUPROFEN 400 MG: 400 TABLET ORAL at 13:57

## 2022-12-28 RX ADMIN — PROPRANOLOL HYDROCHLORIDE 20 MG: 20 TABLET ORAL at 20:01

## 2022-12-28 RX ADMIN — IBUPROFEN 400 MG: 400 TABLET ORAL at 20:01

## 2022-12-28 RX ADMIN — GABAPENTIN 100 MG: 100 CAPSULE ORAL at 13:57

## 2022-12-28 RX ADMIN — GABAPENTIN 100 MG: 100 CAPSULE ORAL at 20:01

## 2022-12-28 RX ADMIN — GABAPENTIN 100 MG: 100 CAPSULE ORAL at 07:38

## 2022-12-28 RX ADMIN — OLANZAPINE 7.5 MG: 7.5 TABLET ORAL at 20:01

## 2022-12-28 RX ADMIN — IBUPROFEN 400 MG: 400 TABLET ORAL at 07:38

## 2022-12-28 RX ADMIN — OLANZAPINE 2.5 MG: 2.5 TABLET, FILM COATED ORAL at 18:08

## 2022-12-28 RX ADMIN — OLANZAPINE 2.5 MG: 2.5 TABLET, FILM COATED ORAL at 13:58

## 2022-12-28 ASSESSMENT — ACTIVITIES OF DAILY LIVING (ADL)
ADLS_ACUITY_SCORE: 31
HYGIENE/GROOMING: INDEPENDENT
LAUNDRY: UNABLE TO COMPLETE
ADLS_ACUITY_SCORE: 31
ORAL_HYGIENE: INDEPENDENT
HYGIENE/GROOMING: INDEPENDENT
ADLS_ACUITY_SCORE: 31
DRESS: INDEPENDENT

## 2022-12-28 NOTE — PROGRESS NOTES
"  [facility] PSYCHIATRY  -  PROGRESS NOTE     ID   Name: Shalom Castaneda  MRN#: [id number]     SUBJECTIVE   Prior to interviewing the patient, I met with nursing and reviewed patient's clinical condition. We discussed clinical care both before and after the interview. I have reviewed the patient's clinical course by review of records including previous notes, labs, and vital signs.     Per nursing, the patient had the following behavioral events over the last 24-hours: none. Slept overnight. No safety concerns.    On psychiatric interview, he is met within his room. He smiles. He states he is sleeping well. He denies any physical pain. No side effects from olanzapine reported. He states he is having AVH int he afternoons and was encouraged to take low dose of olanzapine 2.5 mg to see if that helps. He denies active SI. He denies CAH.        MEDICATIONS   Scheduled Meds:    OLANZapine  7.5 mg Oral At Bedtime     propranolol  20 mg Oral At Bedtime     PRN Meds:.gabapentin, ibuprofen, OLANZapine **OR** OLANZapine, OLANZapine     ALLERGIES   Allergies   Allergen Reactions     Amoxicillin Nausea and Vomiting and Itching     Clindamycin Nausea and Vomiting and Itching     Penicillins Nausea and Vomiting and Itching        VITALS   Vitals: /89   Pulse 98   Temp 98.7  F (37.1  C) (Temporal)   Resp 14   Ht 1.803 m (5' 11\")   Wt 74.8 kg (165 lb)   SpO2 96%   BMI 23.01 kg/m       MENTAL STATUS EXAM   Appearance:  awake, alert, broken glasses, unshaven  Attitude:  cooperative  Eye Contact:  good  Mood:  \"alright\"  Affect: mildly anxious  Speech:  clear, coherent  Psychomotor Behavior:  no evidence of tardive dyskinesia, dystonia, or tics  Thought Process:  logical and linear  Associations:  no loose associations  Thought Content:  auditory hallucinations present, reduced, denies CAH  Insight:  fair  Judgment:  fair  Oriented to:  time, person, and place  Attention Span and Concentration:  limited  Recent and " Remote Memory:  intact  Gait and Station: Normal       LABS   No results found for this or any previous visit (from the past 24 hour(s)).      ASSESSMENT   Summary: Shalom Castaneda is a 37 year old male, single,  ([place]) with a past psychiatric history notable for schizoaffective disorder. Currently living in [place], [place] with his mother, 2 dogs and 2 cats.  He is unemployed, last working 1 year ago at [employer].  Smoking 1/2 bowl of cannabis daily.  Off medications secondary to difficulties with refills. Self-presents to outside emergency department on a voluntary basis with worsening depressive symptoms, worsening command AVH instructing him to kill or harm himself. Patient was subsequently transferred and accepted for inpatient psychiatric hospitalization at [facility] for further safety and further stabilization     Daily Progress:  Reporting daytime AVH. Encouraged to take small doses of olanzapine. He states that he does not feel sedated with the decrease in evening dose and that he prefers to stay on 7.5 mg at bedtime. He would like to attend an IRTS - he is aware that this will occur from outpatient setting and not direct transfer from hospital. Anticipate dismissal on Friday.        DIAGNOSTIC FORMULATION   #Schizoaffective Disorder, depressed type  #cannabis use disorder, severe  #alcohol abuse versus use disorder     PLAN     Location: Unit 5  Legal Status: Orders Placed This Encounter      Legal status Voluntary    Safety Assessment:    Behavioral Orders   Procedures     Code 1 - Restrict to Unit     Routine Programming     As clinically indicated     Status 15     Every 15 minutes.        PTA medications held:   -propranolol  -cogentin     PTA medications continued/changed:   -olanzapine 10 mg at bedtime -> 7.5 mg at bedtime      New medications initiated:   -none    Today's Changes:  -continue olanzapine 7.5 mg at bedtime  -encourage 2.5  mg olanzapine for daytime AVH  -anticipate dismissal on Friday, 12/30/22    Programming: Patient will be treated in a therapeutic milieu with appropriate individual and group therapies. Education will be provided on diagnoses, medications, and treatments. Encouraged behavioral activation and participation in group programming.     Medical diagnoses:  Per medicine    Disposition: pending clinical course       ATTESTATION    Wade Jaime MD  [facility]   Psychiatry    Video Visit: Patient has given verbal consent for video visit?: Yes  Type of Service: video visit for mental health treatment  Reason for Video Visit: COVID-19 [place]  Originating Site (patient location): [facility]  Distant Site (provider location): Remote Location  Mode of Communication: Video Conference via [email or web address]  Time of Service: Date: 12/28/2022 , Start: 10:00 end: 10:30

## 2022-12-28 NOTE — PLAN OF CARE
Problem: Suicidal Behavior  Goal: Suicidal Behavior is Absent or Managed  Outcome: Progressing     Problem: Adult Behavioral Health Plan of Care  Goal: Patient-Specific Goal (Individualization)  Description: Patient will attend at least 50% of unit programming.    Patient will be compliant with medications, assessments, and treatment team recommendation.   Patient will sleep at least 5 hours per NOC.  Outcome: Progressing       Face to Face report received from Lola PERRY RN.  Rounding completed. Patient observed in bed with eyes closed appearing to sleep with resting respirations noted. 15 minute and PRN checks all night. No complaints offered. Will continue to monitor.    Face to face end of shift report communicated to [facility] day shift RN.     Cindi Arce RN  12/28/2022  1:25 AM

## 2022-12-28 NOTE — PLAN OF CARE
Face to face end of shift report received from Cindi GARCIA RN. Rounding completed. Patient observed in lounge.     Pt has been calm, cooperative this morning. He reports chronic pain in back and shoulder. Requested Tylenol for this. Pt reports increased anxiety this morning as well. Denies SI/HI. Pt denied any worsening AH. He is up in the lounge and does not appear sedated this morning. He is withdrawn, but engages with this writer when approached. Pt showered this shift. He has not attended any groups this shift. No falls- steady gait. He is able to make his needs known. Frequent rounding.       Problem: Adult Behavioral Health Plan of Care  Goal: Patient-Specific Goal (Individualization)  Description: Patient will attend at least 50% of unit programming.    Patient will be compliant with medications, assessments, and treatment team recommendation.   Patient will sleep at least 5 hours per NOC.  Outcome: Progressing     Problem: Suicidal Behavior  Goal: Suicidal Behavior is Absent or Managed  Outcome: Progressing       Jeaneth Silverman RN  12/28/2022  7:54 AM

## 2022-12-28 NOTE — PLAN OF CARE
Problem: Adult Behavioral Health Plan of Care  Goal: Patient-Specific Goal (Individualization)  Description: Patient will attend at least 50% of unit programming.    Patient will be compliant with medications, assessments, and treatment team recommendation.   Patient will sleep at least 5 hours per NOC.  Outcome: Progressing    A&O, VSS-slightly htn, chronic pain to L-shoulder and back 8/10--Ibuprofen 400 mg rec'd x two doses.   States his hallucinations are improved but it's not the voices today its the visual hallucinations. Pt states he is seeing the people he connects to his voices. Pt again asked about tremor which pt states is chronic. Pt denies he could be having ETOH withdrawal though two large 14% alcohol beers where with him upon admit.  symptoms. In looking at pt PTA meds he takes Propranolol 20 mg at HS--on-call notified and new order rec'd. Resume pta inderal.    Face to face end of shift report communicated to oncoming shift.     Lola Fermin RN  12/27/2022  6:12 PM    Problem: Suicidal Behavior  Goal: Suicidal Behavior is Absent or Managed  Outcome: Progressing   Goal Outcome Evaluation:    Plan of Care Reviewed With: patient

## 2022-12-28 NOTE — PLAN OF CARE
Faxed IRTS referrals to [facility], [facility], and [facility].    Scheduled hospital follow up with [facility]  PCP: Anne- Friday January 6th @ 1:00 PM.    Discharge Plan: Home with services and referrals out to IRTS  Services: Therapy and psychiatry   Placement: NA  Referrals made: [facility], [facility], and [facility].   Follow Ups: Will make upon discharge  Court status: KATY  [place]ley/SDM: KATY

## 2022-12-29 PROCEDURE — 124N000001 HC R&B MH

## 2022-12-29 PROCEDURE — 250N000013 HC RX MED GY IP 250 OP 250 PS 637: Performed by: STUDENT IN AN ORGANIZED HEALTH CARE EDUCATION/TRAINING PROGRAM

## 2022-12-29 PROCEDURE — 250N000013 HC RX MED GY IP 250 OP 250 PS 637: Performed by: PSYCHIATRY & NEUROLOGY

## 2022-12-29 PROCEDURE — 250N000013 HC RX MED GY IP 250 OP 250 PS 637: Performed by: NURSE PRACTITIONER

## 2022-12-29 PROCEDURE — 99232 SBSQ HOSP IP/OBS MODERATE 35: CPT | Mod: GT | Performed by: PSYCHIATRY & NEUROLOGY

## 2022-12-29 RX ORDER — PROPRANOLOL HYDROCHLORIDE 20 MG/1
20 TABLET ORAL AT BEDTIME
Qty: 30 TABLET | Refills: 0 | Status: SHIPPED | OUTPATIENT
Start: 2022-12-29 | End: 2023-01-28

## 2022-12-29 RX ORDER — IBUPROFEN 800 MG/1
800 TABLET, FILM COATED ORAL EVERY 6 HOURS PRN
Status: DISCONTINUED | OUTPATIENT
Start: 2022-12-29 | End: 2023-01-02 | Stop reason: HOSPADM

## 2022-12-29 RX ORDER — ACETAMINOPHEN 325 MG/1
975 TABLET ORAL EVERY 6 HOURS PRN
Status: DISCONTINUED | OUTPATIENT
Start: 2022-12-29 | End: 2023-01-02 | Stop reason: HOSPADM

## 2022-12-29 RX ORDER — OLANZAPINE 7.5 MG/1
7.5 TABLET, FILM COATED ORAL AT BEDTIME
Qty: 30 TABLET | Refills: 1 | Status: SHIPPED | OUTPATIENT
Start: 2022-12-29 | End: 2022-12-31

## 2022-12-29 RX ORDER — OLANZAPINE 2.5 MG/1
2.5 TABLET, FILM COATED ORAL 2 TIMES DAILY PRN
Qty: 60 TABLET | Refills: 0 | Status: SHIPPED | OUTPATIENT
Start: 2022-12-29 | End: 2022-12-31

## 2022-12-29 RX ORDER — OLANZAPINE 10 MG/1
10 TABLET ORAL AT BEDTIME
Status: DISCONTINUED | OUTPATIENT
Start: 2022-12-29 | End: 2023-01-02 | Stop reason: HOSPADM

## 2022-12-29 RX ADMIN — GABAPENTIN 100 MG: 100 CAPSULE ORAL at 16:10

## 2022-12-29 RX ADMIN — IBUPROFEN 800 MG: 800 TABLET, FILM COATED ORAL at 20:29

## 2022-12-29 RX ADMIN — GABAPENTIN 100 MG: 100 CAPSULE ORAL at 07:42

## 2022-12-29 RX ADMIN — PROPRANOLOL HYDROCHLORIDE 20 MG: 20 TABLET ORAL at 20:28

## 2022-12-29 RX ADMIN — OLANZAPINE 2.5 MG: 2.5 TABLET, FILM COATED ORAL at 07:44

## 2022-12-29 RX ADMIN — OLANZAPINE 10 MG: 10 TABLET, FILM COATED ORAL at 13:06

## 2022-12-29 RX ADMIN — ACETAMINOPHEN 975 MG: 325 TABLET, FILM COATED ORAL at 18:05

## 2022-12-29 RX ADMIN — GABAPENTIN 100 MG: 100 CAPSULE ORAL at 20:27

## 2022-12-29 RX ADMIN — IBUPROFEN 400 MG: 400 TABLET ORAL at 07:42

## 2022-12-29 RX ADMIN — OLANZAPINE 10 MG: 10 TABLET, FILM COATED ORAL at 20:27

## 2022-12-29 ASSESSMENT — ACTIVITIES OF DAILY LIVING (ADL)
LAUNDRY: UNABLE TO COMPLETE
ADLS_ACUITY_SCORE: 31
ADLS_ACUITY_SCORE: 31
ORAL_HYGIENE: INDEPENDENT
ADLS_ACUITY_SCORE: 31
HYGIENE/GROOMING: INDEPENDENT
ADLS_ACUITY_SCORE: 31
HYGIENE/GROOMING: INDEPENDENT
DRESS: SCRUBS (BEHAVIORAL HEALTH)
ADLS_ACUITY_SCORE: 31
ADLS_ACUITY_SCORE: 31
ORAL_HYGIENE: INDEPENDENT
DRESS: INDEPENDENT

## 2022-12-29 NOTE — DISCHARGE SUMMARY
[facility] PSYCHIATRY  DISCHARGE SUMMARY     DISCHARGE DATA     Shalom Castaneda MRN# [id number]   Age: 37 year old YOB: 1985     Date of Admission: 12/22/2022  Date of Discharge: December 30, 2022  Discharge Provider: Wade Jaime MD       REASON FOR ADMISSION   Shalom Castaneda is a 37 year old male, single,  ([place]) with a past psychiatric history notable for schizoaffective disorder. Currently living in [place], [place] with his mother, 2 dogs and 2 cats.  He is unemployed, last working 1 year ago at [employer].  Smoking 1/2 bowl of cannabis daily.  Off medications secondary to difficulties with refills. Self-presents to outside emergency department on a voluntary basis with worsening depressive symptoms, worsening command AVH instructing him to kill or harm himself. Patient was subsequently transferred and accepted for inpatient psychiatric hospitalization at [facility] for further safety and further stabilization        DISCHARGE DIAGNOSES   #Schizoaffective Disorder, depressed type  #cannabis use disorder, severe  #alcohol abuse versus use disorder     HOSPITAL COURSE   Patient was admitted to unit 5 due to the aforementioned presentation. The patient was placed under 15 minute checks to ensure patient safety. The patient participated in unit programming and groups as able.    Legal status during hospitalization was voluntary .Mr. Castaneda did not require seclusion/restraint during hospitalization.     We reviewed with Mr. Castaneda current and past medication trials including duration, dose, response and side effects. During this hospitalization, the following changes to the patient's psychotropic medications were made:    PTA medications held:   -cogentin     PTA medications continued/changed:   -olanzapine 10 mg at bedtime -> 7.5 mg at bedtime as 10 mg was oversedating  -propranolol 20 mg at bedtime      New medications  initiated:   -none     Today's Changes:  -continue olanzapine 7.5 mg at bedtime  -encourage 2.5 mg olanzapine for daytime AVH  -anticipate dismissal on Friday, 12/30/22    Mr. Castaneda progressed gradually related to response to medication changes, confidence in skills to manage symptoms and establishment of a supportive community network . By the time of discharge, his symptoms had improved significantly.  Depression improved with increased confidence in ability to manage symptoms., Anxiety improved with increased confidence in ability to manage symptoms., Suicidal ideation resolved with adequate outpatient plan in place.  and Psychotropic medications utilized were found to be helpful without significant adverse side effects. The treatment goals (long-term goal/discharge criteria) were reached.     With the aforementioned changes and supports the patient noticed improvement in their symptoms and felt sufficiently ready for discharge. As a result, Shalom Castaneda was discharged. At the time of discharge, Shalom Castaneda was determined to not be a danger to self or others. The patient was also medically stable for discharge. At the current time of discharge, the patient does not meet criteria for involuntary hospitalization. On the day of discharge, the patient reports that they do not have suicidal or homicidal ideation. Steps taken to minimize risk include: assessing patient s behavior and thought process daily during hospital stay, discharging patient with adequate plan for follow up for mental and physical health and discussing safety plan of returning to the hospital should the patient ever have thoughts of harming themselves or others. Therefore, based on all available evidence including the factors cited above, the patient does not appear to be at imminent risk for self-harm, and is appropriate for outpatient level of care. The acute crisis is resolved and Shalom is discharging in improved condition. Though  Shalom's acute crisis has resolved, there remains a higher risk of suicide over the long term compared to the general population. Factors that may be associated with relapse include worsening of depressive symptoms, alcohol use, illicit substance use, not taking medications, lack of mental health follow-up appointments and work/family/relationship stressors. Shalom Castaneda has a plan in place to mitigate these stressors, is hopeful about the future ,and is not assessed to be a imminent risk to self or anyone else and thus is not assessed to be holdable.  Shalom has received maximal benefit from the current hospital stay. Shalom Castaneda set to discharge.        DISCHARGE MEDICATIONS     Current Discharge Medication List      CONTINUE these medications which have CHANGED    Details   !! OLANZapine (ZYPREXA) 2.5 MG tablet Take 1 tablet (2.5 mg) by mouth 2 times daily as needed (take for hallucinations during the day)  Qty: 60 tablet, Refills: 0    Associated Diagnoses: Schizoaffective disorder, depressive type (H)      !! OLANZapine (ZYPREXA) 7.5 MG tablet Take 1 tablet (7.5 mg) by mouth At Bedtime for 30 days  Qty: 30 tablet, Refills: 1    Associated Diagnoses: Schizoaffective disorder, depressive type (H)      propranolol (INDERAL) 20 MG tablet Take 1 tablet (20 mg) by mouth At Bedtime for 30 days  Qty: 30 tablet, Refills: 0    Associated Diagnoses: Schizoaffective disorder, depressive type (H)       !! - Potential duplicate medications found. Please discuss with provider.      CONTINUE these medications which have NOT CHANGED    Details   medical cannabis (Patient's own supply) Take as directed by prescribing facility.         STOP taking these medications       benztropine (COGENTIN) 2 MG tablet Comments:   Reason for Stopping:         diphenhydrAMINE (BENADRYL) 50 MG capsule Comments:   Reason for Stopping:         ferrous sulfate (FE TABS) 325 (65 Fe) MG EC tablet Comments:   Reason for Stopping:          "vitamin D3 (CHOLECALCIFEROL) 50 mcg (2000 units) tablet Comments:   Reason for Stopping:                  VITALS   Vitals: /78   Pulse 66   Temp 97.3  F (36.3  C) (Temporal)   Resp 16   Ht 1.803 m (5' 11\")   Wt 74.8 kg (165 lb)   SpO2 98%   BMI 23.01 kg/m       MENTAL STATUS EXAM   Appearance: Alert, oriented, dressed in hospital scrubs, appears stated age   Attitude: Cooperative   Eye Contact: Good  Mood: \"Better\"  Affect: Full range of affect  Speech: Normal rate and rhythm   Psychomotor Behavior: No tremor, rigidity, or psychomotor abnormality   Thought Process: Logical, goal directed   Associations: No loose associations   Thought Content: Denies SI or plan. No SIB.  No evidence of delusional thought. AVH lessened in intensity and frequency with addition of medications, lower than baseline upon dismissal.   Insight: Good  Judgment: Good  Oriented to: Person, place, and time  Attention Span and Concentration: Intact  Recent and Remote Memory: Intact  Language: English with appropriate syntax and vocabulary  Fund of Knowledge: Average  Muscle Strength and Tone: Grossly normal  Gait and Station: Grossly normal       DISCHARGE PLAN     1.  Education given regarding diagnostic and treatment options with risks, benefits and alternatives with adequate verbalization of understanding.  2.  Discharge to home. Upon detailed review of risk factors, patient amenable for release.   3.  Continue aforementioned medications and associated medication changes with follow-up by outpatient provider.  4.  Crisis management planning in place.    5.  Nursing and  to review further discharge recommendations.   6.  Active issues: No active medical issues requiring immediate follow-up care.  7.  Patient is being discharged with the following appointments as detailed below:    See AVS (please follow-up with IRTS referrals that were placed for you during hospitalization)       DISCHARGE SERVICES PROVIDED     80 " The patient is a 68y Male complaining of pain, knee. minutes spent on discharge services, including:  Final examination of patient.  Review and discussion of hospital stay.  Instructions for continued outpatient care/goals.  Preparation of discharge records.  Preparation of medications refills and new prescriptions.  Preparation of applicable referral forms.        ATTESTATION   Wade Jaime MD  [facility]   Psychiatry    Video Visit: Patient has given verbal consent for video visit?: Yes  Type of Service: video visit for mental health treatment  Reason for Video Visit: COVID-19 [place]  Originating Site (patient location): [facility]  Distant Site (provider location): Remote Location  Mode of Communication: Video Conference via [employer]  Time of Service: Date: December 29, 2022 , Start:07:00 end: 08:00     LABS THIS ADMISSION     Results for orders placed or performed during the hospital encounter of 12/22/22   Comprehensive metabolic panel     Status: Abnormal   Result Value Ref Range    Sodium 141 136 - 145 mmol/L    Potassium 3.5 3.4 - 5.3 mmol/L    Chloride 103 98 - 107 mmol/L    Carbon Dioxide (CO2) 26 22 - 29 mmol/L    Anion Gap 12 7 - 15 mmol/L    Urea Nitrogen 10.9 6.0 - 20.0 mg/dL    Creatinine 1.22 (H) 0.67 - 1.17 mg/dL    Calcium 9.3 8.6 - 10.0 mg/dL    Glucose 95 70 - 99 mg/dL    Alkaline Phosphatase 95 40 - 129 U/L    AST 21 10 - 50 U/L    ALT 18 10 - 50 U/L    Protein Total 7.2 6.4 - 8.3 g/dL    Albumin 3.8 3.5 - 5.2 g/dL    Bilirubin Total 0.7 <=1.2 mg/dL    GFR Estimate 78 >60 mL/min/1.73m2   Extra Tube     Status: None    Narrative    The following orders were created for panel order Extra Tube.  Procedure                               Abnormality         Status                     ---------                               -----------         ------                     Extra Red Top Tube[[id number]]                               Final result               Extra Purple Top Tube[[id number]]                             Final result                 Please view results for these tests on the individual orders.   Extra Red Top Tube     Status: None   Result Value Ref Range    Hold Specimen JIC    Extra Purple Top Tube     Status: None   Result Value Ref Range    Hold Specimen JIC

## 2022-12-29 NOTE — PLAN OF CARE
Problem: Adult Behavioral Health Plan of Care  Goal: Patient-Specific Goal (Individualization)  Description: Patient will attend at least 50% of unit programming.    Patient will be compliant with medications, assessments, and treatment team recommendation.   Patient will sleep at least 5 hours per NOC.  Outcome: Progressing     A&O, bp remains slightly elevated (see flow sheet) chronic pain to left shoulder and back 8/10-pt receiving prn Ibuprofen.   Full range affect with clear linear conversations.   Endorses moderate anxiety -receiving Gabapentin.   Active on unit-attends groups, watches tv and talks with peers. Interactions appear appropriate.   Pleasant demeanor--lets needs be known.     Problem: Suicidal Behavior  Goal: Suicidal Behavior is Absent or Managed  Outcome: Progressing   Goal Outcome Evaluation:    Plan of Care Reviewed With: patient      Face to face end of shift report communicated to *oncoming shift.     Lola Fermin RN  12/28/2022  7:47 PM

## 2022-12-29 NOTE — PLAN OF CARE
Face to face end of shift report received from Cindi GARCIA RN. Rounding completed. Patient observed in [facility].     Pt has been calm, cooperative this shift. He is withdrawn, but participates in unit milieu. He does engage in conversation when approached. He reports that his hallucinations are worse this morning and feelings that they are more visual than auditory. Administered Zyprexa 2.5 mg. Pt reported chronic pain as well in back and shoulders. Administered Gabapentin and Ibuprofen for this as well. Pt has gone to groups throughout the shift with active participation. Pt reported that the Zyprexa didn't work as well this morning and requested the higher mg PRN. Pt reported that he doesn't feel that he was that sedated with the higher dose at HS and is waking up at 0600 now. He hasn't showered this shift. He has had a steady gait- no falls. He is able to make his needs known. Frequent rounding.       Problem: Adult Behavioral Health Plan of Care  Goal: Patient-Specific Goal (Individualization)  Description: Patient will attend at least 50% of unit programming.    Patient will be compliant with medications, assessments, and treatment team recommendation.   Patient will sleep at least 5 hours per NOC.  Outcome: Progressing     Problem: Suicidal Behavior  Goal: Suicidal Behavior is Absent or Managed  Outcome: Progressing           Jeaneth Silverman RN  12/29/2022  2:22 PM

## 2022-12-29 NOTE — PLAN OF CARE
Problem: Suicidal Behavior  Goal: Suicidal Behavior is Absent or Managed  Outcome: Progressing     Problem: Adult Behavioral Health Plan of Care  Goal: Patient-Specific Goal (Individualization)  Description: Patient will attend at least 50% of unit programming.    Patient will be compliant with medications, assessments, and treatment team recommendation.   Patient will sleep at least 5 hours per NOC.  Outcome: Progressing     Face to Face report received from Lola PERRY RN.  Rounding completed. Patient observed in bed with eyes closed appearing to sleep with resting respirations noted at the beginning of NOC. 15 minute and PRN checks all night. No complaints offered. Will continue to monitor.    Face to face end of shift report communicated to [facility] day shift RN.     Cindi Arce RN  12/29/2022  2:11 AM

## 2022-12-30 PROCEDURE — 99232 SBSQ HOSP IP/OBS MODERATE 35: CPT | Mod: GT | Performed by: PSYCHIATRY & NEUROLOGY

## 2022-12-30 PROCEDURE — 250N000013 HC RX MED GY IP 250 OP 250 PS 637: Performed by: PSYCHIATRY & NEUROLOGY

## 2022-12-30 PROCEDURE — 250N000013 HC RX MED GY IP 250 OP 250 PS 637: Performed by: STUDENT IN AN ORGANIZED HEALTH CARE EDUCATION/TRAINING PROGRAM

## 2022-12-30 PROCEDURE — 250N000013 HC RX MED GY IP 250 OP 250 PS 637: Performed by: NURSE PRACTITIONER

## 2022-12-30 PROCEDURE — 124N000001 HC R&B MH

## 2022-12-30 RX ORDER — OLANZAPINE 5 MG/1
5 TABLET ORAL 2 TIMES DAILY PRN
Status: DISCONTINUED | OUTPATIENT
Start: 2022-12-30 | End: 2023-01-02 | Stop reason: HOSPADM

## 2022-12-30 RX ADMIN — OLANZAPINE 5 MG: 5 TABLET, FILM COATED ORAL at 10:57

## 2022-12-30 RX ADMIN — GABAPENTIN 100 MG: 100 CAPSULE ORAL at 14:13

## 2022-12-30 RX ADMIN — OLANZAPINE 10 MG: 10 TABLET, FILM COATED ORAL at 20:20

## 2022-12-30 RX ADMIN — GABAPENTIN 100 MG: 100 CAPSULE ORAL at 08:08

## 2022-12-30 RX ADMIN — IBUPROFEN 800 MG: 800 TABLET, FILM COATED ORAL at 14:13

## 2022-12-30 RX ADMIN — IBUPROFEN 800 MG: 800 TABLET, FILM COATED ORAL at 20:20

## 2022-12-30 RX ADMIN — ACETAMINOPHEN 975 MG: 325 TABLET, FILM COATED ORAL at 17:26

## 2022-12-30 RX ADMIN — ACETAMINOPHEN 975 MG: 325 TABLET, FILM COATED ORAL at 10:57

## 2022-12-30 RX ADMIN — OLANZAPINE 5 MG: 5 TABLET, FILM COATED ORAL at 17:30

## 2022-12-30 RX ADMIN — PROPRANOLOL HYDROCHLORIDE 20 MG: 20 TABLET ORAL at 20:20

## 2022-12-30 RX ADMIN — IBUPROFEN 800 MG: 800 TABLET, FILM COATED ORAL at 08:08

## 2022-12-30 ASSESSMENT — ACTIVITIES OF DAILY LIVING (ADL)
HYGIENE/GROOMING: INDEPENDENT
ADLS_ACUITY_SCORE: 31
DRESS: INDEPENDENT
ADLS_ACUITY_SCORE: 31
ORAL_HYGIENE: INDEPENDENT
LAUNDRY: UNABLE TO COMPLETE
ADLS_ACUITY_SCORE: 31

## 2022-12-30 NOTE — PLAN OF CARE
Problem: Suicidal Behavior  Goal: Suicidal Behavior is Absent or Managed  Outcome: Progressing     Problem: Adult Behavioral Health Plan of Care  Goal: Patient-Specific Goal (Individualization)  Description: Patient will attend at least 50% of unit programming.    Patient will be compliant with medications, assessments, and treatment team recommendation.   Patient will sleep at least 5 hours per NOC.  Outcome: Progressing      Face to Face report received from Lola LONG RN.  Rounding completed. Patient observed in bed with eyes closed appearing to sleep with resting respirations noted at the beginning of NOC. 15 minute and PRN checks all night. No complaints offered. Will continue to monitor.    Face to face end of shift report communicated to [facility] day shift RN.     Cindi Arce RN  12/30/2022  2:01 AM

## 2022-12-30 NOTE — PLAN OF CARE
Pt was accepted to [facility] IRTS for Monday 1/2/23. Paperwork completed and faxed. They request 30 days of medication to be sent with the pt. Attempted to call [employer] for insurance transport. They are closed today due to the holiday. Will Contact  to ensure ride for pt.

## 2022-12-30 NOTE — PROGRESS NOTES
"  [facility] PSYCHIATRY  -  PROGRESS NOTE     ID   Name: Shalom Castaneda  MRN#: [id number]     SUBJECTIVE   Prior to interviewing the patient, I met with nursing and reviewed patient's clinical condition. We discussed clinical care both before and after the interview. I have reviewed the patient's clinical course by review of records including previous notes, labs, and vital signs.     Per nursing, the patient had the following behavioral events over the last 24-hours: none. Slept overnight. No safety concerns.    On psychiatric interview, he is met within his room. He initially reports looking forward to dismissal but reports he is having worsening visual hallucinations and more frequency auditory hallucinations. Reports he slept well and does not feel oversedated in AM but is waking up earlier than preferred. Reporting some chronic musculoskeletal pain.  Denies active SI.        MEDICATIONS   Scheduled Meds:    OLANZapine  10 mg Oral At Bedtime     propranolol  20 mg Oral At Bedtime     PRN Meds:.acetaminophen, gabapentin, ibuprofen, OLANZapine **OR** OLANZapine, OLANZapine     ALLERGIES   Allergies   Allergen Reactions     Amoxicillin Nausea and Vomiting and Itching     Clindamycin Nausea and Vomiting and Itching     Penicillins Nausea and Vomiting and Itching        VITALS   Vitals: /81   Pulse 96   Temp 99.3  F (37.4  C) (Temporal)   Resp 16   Ht 1.803 m (5' 11\")   Wt 74.8 kg (165 lb)   SpO2 96%   BMI 23.01 kg/m       MENTAL STATUS EXAM   Appearance:  awake, alert, broken glasses, unshaven  Attitude:  cooperative  Eye Contact:  good  Mood:  \"okay\"  Affect:  mood congruent  Speech:  clear, coherent  Psychomotor Behavior:  no evidence of tardive dyskinesia, dystonia, or tics  Thought Process:  logical and linear  Associations:  no loose associations  Thought Content:  auditory hallucinations present, reduced, denies CAH  Insight:  fair  Judgment:  fair  Oriented to:  time, person, and " place  Attention Span and Concentration:  limited  Recent and Remote Memory:  intact  Gait and Station: Normal       LABS   No results found for this or any previous visit (from the past 24 hour(s)).      ASSESSMENT   Summary: Shalom Castaneda is a 37 year old male, single,  ([place]) with a past psychiatric history notable for schizoaffective disorder. Currently living in [place], [place] with his mother, 2 dogs and 2 cats.  He is unemployed, last working 1 year ago at [employer].  Smoking 1/2 bowl of cannabis daily.  Off medications secondary to difficulties with refills. Self-presents to outside emergency department on a voluntary basis with worsening depressive symptoms, worsening command AVH instructing him to kill or harm himself. Patient was subsequently transferred and accepted for inpatient psychiatric hospitalization at [facility] for further safety and further stabilization     Daily Progress:  Original plan to dismiss tomorrow. Will discontinue discharge for now. Increase olanzapine to 10 mg at bedtime and increase PRN daily dose to 5 mg and observe. He is having worsening AVH and continues to require inpatient psychiatric hosptialization.         DIAGNOSTIC FORMULATION   #Schizoaffective Disorder, depressed type  #cannabis use disorder, severe  #alcohol abuse versus use disorder     PLAN     Location: Unit [facility]  Legal Status: Orders Placed This Encounter      Legal status Voluntary    Safety Assessment:    Behavioral Orders   Procedures     Code 1 - Restrict to Unit     Routine Programming     As clinically indicated     Status 15     Every 15 minutes.        PTA medications held:   -propranolol  -cogentin     PTA medications continued/changed:   -olanzapine 10 mg at bedtime -> 7.5 mg at bedtime      New medications initiated:   -none    Today's Changes:  -increase olanzapine to 10 mg at bedtime  -increase PRN to 5 mg daily dose     Programming:  Patient will be treated in a therapeutic milieu with appropriate individual and group therapies. Education will be provided on diagnoses, medications, and treatments. Encouraged behavioral activation and participation in group programming.     Medical diagnoses:  Per medicine    Disposition: pending clinical course       ATTESTATION    Wade Jaime MD  [facility]   Psychiatry    Video Visit: Patient has given verbal consent for video visit?: Yes  Type of Service: video visit for mental health treatment  Reason for Video Visit: COVID-19 [place]  Originating Site (patient location): [facility]  Distant Site (provider location): Remote Location  Mode of Communication: Video Conference via [employer]  Time of Service: Date: 12/29 Start:09:00 end: 09:30

## 2022-12-30 NOTE — PLAN OF CARE
"Face to face end of shift report received from Cindi GARCIA RN. Rounding completed. Patient observed in  bed, awake.     Pt has been calm, cooperative this shift. He reported that he does not feel sedated from the Zyprexa being increased back to 10 mg. He feels he slept well and stated \"I feel refreshed\". He denied any SI/HI. Reports AH/VH though much less than yesterday. Reports chronic 8/10 back/shoulder pain- Received Ibuprofen, Tylenol, and Gabapentin for this throughout the day. Pt has gone to groups with active participation. He declined shower supplies. He has a steady gait- no falls. He is able to make his needs known. Frequent rounding.       Problem: Adult Behavioral Health Plan of Care  Goal: Patient-Specific Goal (Individualization)  Description: Patient will attend at least 50% of unit programming.    Patient will be compliant with medications, assessments, and treatment team recommendation.   Patient will sleep at least 5 hours per NOC.  Outcome: Progressing     Problem: Suicidal Behavior  Goal: Suicidal Behavior is Absent or Managed  Outcome: Progressing       Jeaneth Silverman RN  12/30/2022  9:29 AM    "

## 2022-12-30 NOTE — PLAN OF CARE
Problem: Adult Behavioral Health Plan of Care  Goal: Patient-Specific Goal (Individualization)  Description: Patient will attend at least 50% of unit programming.    Patient will be compliant with medications, assessments, and treatment team recommendation.   Patient will sleep at least 5 hours per NOC.  Outcome: Progressing    A&O, VSS, chronic pain to L-shoulder and back-Ibuprofen 400 mg rec'd. Ibuprofen up to 800 mg and Tylenol 975 mg ordered as well.   Full range affect with clear conversations.   Stays busy in milieu by attending groups and watching tv, playing cards, and talking with peers.    Josep SI,SIB and HI.      Problem: Suicidal Behavior  Goal: Suicidal Behavior is Absent or Managed  Outcome: Progressing   Goal Outcome Evaluation:    Plan of Care Reviewed With: patient

## 2022-12-30 NOTE — PROGRESS NOTES
"  [facility] PSYCHIATRY  -  PROGRESS NOTE     ID   Name: Shalom Castaneda  MRN#: [id number]     SUBJECTIVE   Prior to interviewing the patient, I met with nursing and reviewed patient's clinical condition. We discussed clinical care both before and after the interview. I have reviewed the patient's clinical course by review of records including previous notes, labs, and vital signs.     Per nursing, the patient had the following behavioral events over the last 24-hours: none.     On psychiatric interview, he is met within his room. He is glad he stayed and did not discharge as planned today. He feels his voices still need continued management. He is receptive to taking an increased PRN dose today and seeing how it goes. He has been accepted to an IRTS facility for Monday 1/2/23- he is in agreement to stabilize over the Holiday weekend and dismiss to IRTS on Monday. Denies SI.        MEDICATIONS   Scheduled Meds:    OLANZapine  10 mg Oral At Bedtime     propranolol  20 mg Oral At Bedtime     PRN Meds:.acetaminophen, gabapentin, ibuprofen, OLANZapine **OR** OLANZapine, OLANZapine     ALLERGIES   Allergies   Allergen Reactions     Amoxicillin Nausea and Vomiting and Itching     Clindamycin Nausea and Vomiting and Itching     Penicillins Nausea and Vomiting and Itching        VITALS   Vitals: /97   Pulse 94   Temp 99.3  F (37.4  C) (Temporal)   Resp 16   Ht 1.803 m (5' 11\")   Wt 74.8 kg (165 lb)   SpO2 98%   BMI 23.01 kg/m       MENTAL STATUS EXAM   Appearance:  awake, alert, broken glasses, unshaven  Attitude:  cooperative  Eye Contact:  good  Mood:  \"so-so\"  Affect:  flat  Speech:  clear, coherent  Psychomotor Behavior:  no evidence of tardive dyskinesia, dystonia, or tics  Thought Process:  logical and linear  Associations:  no loose associations  Thought Content:  auditory hallucinations present, reduced, denies CAH  Insight:  fair  Judgment:  fair  Oriented to:  time, person, and place  Attention " Span and Concentration:  limited  Recent and Remote Memory:  intact  Gait and Station: Normal       LABS   No results found for this or any previous visit (from the past 24 hour(s)).      ASSESSMENT   Summary: Shalom Castaneda is a 37 year old male, single,  ([place]) with a past psychiatric history notable for schizoaffective disorder. Currently living in [place], [place] with his mother, 2 dogs and 2 cats.  He is unemployed, last working 1 year ago at [employer].  Smoking 1/2 bowl of cannabis daily.  Off medications secondary to difficulties with refills. Self-presents to outside emergency department on a voluntary basis with worsening depressive symptoms, worsening command AVH instructing him to kill or harm himself. Patient was subsequently transferred and accepted for inpatient psychiatric hospitalization at [facility] for further safety and further stabilization     Daily Progress:  Has been accepted to IRTS on Monday 1/3/22. Will sign necessary paperwork. Will continue to optimize olanzapine prior to dismissal.        DIAGNOSTIC FORMULATION   #Schizoaffective Disorder, depressed type  #cannabis use disorder, severe  #alcohol abuse versus use disorder     PLAN     Location: Unit [facility]  Legal Status: Orders Placed This Encounter      Legal status Voluntary    Safety Assessment:    Behavioral Orders   Procedures     Code 1 - Restrict to Unit     Routine Programming     As clinically indicated     Status 15     Every 15 minutes.        PTA medications held:   -propranolol  -cogentin     PTA medications continued/changed:   -olanzapine 10 mg at bedtime -> 7.5 mg at bedtime      New medications initiated:   -none    Today's Changes:  -increase olanzapine to 10 mg at bedtime  -increase PRN to 5 mg daily dose     Programming: Patient will be treated in a therapeutic milieu with appropriate individual and group therapies. Education will be provided on diagnoses,  medications, and treatments. Encouraged behavioral activation and participation in group programming.     Medical diagnoses:  Per medicine    Disposition: IRTS on Monday 1/2/23       ATTESTATION    Wade Jaime MD  [facility]   Psychiatry    Video Visit: Patient has given verbal consent for video visit?: Yes  Type of Service: video visit for mental health treatment  Reason for Video Visit: COVID-19 [place]  Originating Site (patient location): [facility]  Distant Site (provider location): Remote Location  Mode of Communication: Video Conference via [employer]  Time of Service: Date: 12/30/2022 , Start:09:00 end: 09:30

## 2022-12-31 PROCEDURE — 124N000001 HC R&B MH

## 2022-12-31 PROCEDURE — 250N000013 HC RX MED GY IP 250 OP 250 PS 637: Performed by: NURSE PRACTITIONER

## 2022-12-31 PROCEDURE — 99232 SBSQ HOSP IP/OBS MODERATE 35: CPT | Mod: GT | Performed by: PSYCHIATRY & NEUROLOGY

## 2022-12-31 PROCEDURE — 250N000013 HC RX MED GY IP 250 OP 250 PS 637: Performed by: STUDENT IN AN ORGANIZED HEALTH CARE EDUCATION/TRAINING PROGRAM

## 2022-12-31 PROCEDURE — 250N000013 HC RX MED GY IP 250 OP 250 PS 637: Performed by: PSYCHIATRY & NEUROLOGY

## 2022-12-31 RX ORDER — OLANZAPINE 10 MG/1
10 TABLET ORAL AT BEDTIME
Qty: 30 TABLET | Refills: 0 | Status: SHIPPED | OUTPATIENT
Start: 2022-12-31 | End: 2023-01-30

## 2022-12-31 RX ORDER — OLANZAPINE 5 MG/1
5 TABLET ORAL 2 TIMES DAILY PRN
Qty: 60 TABLET | Refills: 0 | Status: SHIPPED | OUTPATIENT
Start: 2022-12-31 | End: 2023-01-30

## 2022-12-31 RX ADMIN — PROPRANOLOL HYDROCHLORIDE 20 MG: 20 TABLET ORAL at 20:10

## 2022-12-31 RX ADMIN — GABAPENTIN 100 MG: 100 CAPSULE ORAL at 20:10

## 2022-12-31 RX ADMIN — IBUPROFEN 800 MG: 800 TABLET, FILM COATED ORAL at 15:58

## 2022-12-31 RX ADMIN — GABAPENTIN 100 MG: 100 CAPSULE ORAL at 09:31

## 2022-12-31 RX ADMIN — OLANZAPINE 5 MG: 5 TABLET, FILM COATED ORAL at 10:44

## 2022-12-31 RX ADMIN — OLANZAPINE 10 MG: 10 TABLET, FILM COATED ORAL at 20:09

## 2022-12-31 RX ADMIN — ACETAMINOPHEN 975 MG: 325 TABLET, FILM COATED ORAL at 12:33

## 2022-12-31 RX ADMIN — IBUPROFEN 800 MG: 800 TABLET, FILM COATED ORAL at 09:31

## 2022-12-31 RX ADMIN — GABAPENTIN 100 MG: 100 CAPSULE ORAL at 15:58

## 2022-12-31 RX ADMIN — ACETAMINOPHEN 975 MG: 325 TABLET, FILM COATED ORAL at 18:35

## 2022-12-31 ASSESSMENT — ACTIVITIES OF DAILY LIVING (ADL)
ADLS_ACUITY_SCORE: 31
LAUNDRY: UNABLE TO COMPLETE
ADLS_ACUITY_SCORE: 31
HYGIENE/GROOMING: INDEPENDENT
ADLS_ACUITY_SCORE: 31
DRESS: SCRUBS (BEHAVIORAL HEALTH);INDEPENDENT
ADLS_ACUITY_SCORE: 31
ORAL_HYGIENE: INDEPENDENT

## 2022-12-31 NOTE — PLAN OF CARE
Face to face shift report received from RANDY Hunter.       Problem: Adult Behavioral Health Plan of Care  Goal: Patient-Specific Goal (Individualization)  Description: Patient will attend at least 50% of unit programming.    Patient will be compliant with medications, assessments, and treatment team recommendation.   Patient will sleep at least 5 hours per NOC.  Outcome: Progressing   Calm and cooperative. Denies thoughts of harming self or others. Reports auditory hallucinations of hallucinations, but does feel these have improved. Reports anxiety, requested and received Gabapentin 100mg at 0930. Pleasant and appropriate during conversation. Withdrawn to room, but does come to lounge for meals. Reports shoulder and back pain of 8/10. Requested and received Ibuprofen 800mg at 0930.   1045: Zyprexa 5mg PO for auditory hallucinations.   1233: Tylenol 975mg 7/10 back and shoulder pain.     Problem: Suicidal Behavior  Goal: Suicidal Behavior is Absent or Managed  Outcome: Progressing   Free from self harm or injury this shift.       Face to face end of shift report to be communicated to oncoming RN.

## 2022-12-31 NOTE — PLAN OF CARE
Face to face end of shift report received from Lola PERRY RN. Rounding completed and patient observed laying in bed with eyes closed, breathing regular and unlabored.      Goal Outcome Evaluation:     12/31/22 06:30 Update: Patient appeared to sleep throughout the night with position changes noted. Patient slept approximately  7.5 hours. Patient did not request any PRNs and had no complaints of pain. No falls.    Face to face end of shift report communicated to oncoming RN.      Problem: Adult Behavioral Health Plan of Care  Goal: Patient-Specific Goal (Individualization)  Description: Patient will attend at least 50% of unit programming.    Patient will be compliant with medications, assessments, and treatment team recommendation.   Patient will sleep at least 5 hours per NOC.  Outcome: Progressing     Problem: Suicidal Behavior  Goal: Suicidal Behavior is Absent or Managed  Outcome: Progressing

## 2022-12-31 NOTE — PROGRESS NOTES
"  [facility] PSYCHIATRY  -  PROGRESS NOTE     ID   Name: Shalom Castaneda  MRN#: [id number]     SUBJECTIVE   Prior to interviewing the patient, I met with nursing and reviewed patient's clinical condition. We discussed clinical care both before and after the interview. I have reviewed the patient's clinical course by review of records including previous notes, labs, and vital signs.     Per nursing, the patient had the following behavioral events over the last 24-hours: none.     On psychiatric interview, he is met within his room. He reports he is \"excited\" to go to the IRTS on Monday. He denies any difficultes with olanzapine. Reports AVH are improving. He was tasked with making a new years resolution today, 3 items. He denies SI. No physical pain.        MEDICATIONS   Scheduled Meds:    OLANZapine  10 mg Oral At Bedtime     propranolol  20 mg Oral At Bedtime     PRN Meds:.acetaminophen, gabapentin, ibuprofen, OLANZapine **OR** OLANZapine, OLANZapine     ALLERGIES   Allergies   Allergen Reactions     Amoxicillin Nausea and Vomiting and Itching     Clindamycin Nausea and Vomiting and Itching     Penicillins Nausea and Vomiting and Itching        VITALS   Vitals: /74 (BP Location: Right arm)   Pulse 71   Temp 98.9  F (37.2  C) (Temporal)   Resp 18   Ht 1.803 m (5' 11\")   Wt 74.8 kg (165 lb)   SpO2 98%   BMI 23.01 kg/m       MENTAL STATUS EXAM   Appearance:  awake, alert, broken glasses, unshaven  Attitude:  cooperative  Eye Contact:  good  Mood:  \"good\"  Affect:brighter  Speech:  clear, coherent  Psychomotor Behavior:  no evidence of tardive dyskinesia, dystonia, or tics  Thought Process:  logical and linear  Associations:  no loose associations  Thought Content:  auditory hallucinations present, reduced, denies CAH  Insight:  fair  Judgment:  fair  Oriented to:  time, person, and place  Attention Span and Concentration:  limited  Recent and Remote Memory:  intact  Gait and Station: Normal   "     LABS   No results found for this or any previous visit (from the past 24 hour(s)).      ASSESSMENT   Summary: Shalom Castaneda is a 37 year old male, single,  ([place]) with a past psychiatric history notable for schizoaffective disorder. Currently living in [place], [place] with his mother, 2 dogs and 2 cats.  He is unemployed, last working 1 year ago at [employer].  Smoking 1/2 bowl of cannabis daily.  Off medications secondary to difficulties with refills. Self-presents to outside emergency department on a voluntary basis with worsening depressive symptoms, worsening command AVH instructing him to kill or harm himself. Patient was subsequently transferred and accepted for inpatient psychiatric hospitalization at [facility] for further safety and further stabilization     Daily Progress:  Has been accepted to IRTS on Monday 1/3/22. He tolerated increase back to 10 mg olanapine and 5 mg BID PRN. Brighter today. Looking forward to transitioning to IR on Monday.        DIAGNOSTIC FORMULATION   #Schizoaffective Disorder, depressed type  #cannabis use disorder, severe  #alcohol abuse versus use disorder     PLAN     Location: Unit [facility]  Legal Status: Orders Placed This Encounter      Legal status Voluntary    Safety Assessment:    Behavioral Orders   Procedures     Code 1 - Restrict to Unit     Routine Programming     As clinically indicated     Status 15     Every 15 minutes.        PTA medications held:   -propranolol  -cogentin     PTA medications continued/changed:   -olanzapine 10 mg at bedtime -> 7.5 mg at bedtime      New medications initiated:   -none    Today's Changes:  -maintain olanzapine to 10 mg at bedtime  -maintain olanzapine PRN to 5 mg BID    Programming: Patient will be treated in a therapeutic milieu with appropriate individual and group therapies. Education will be provided on diagnoses, medications, and treatments. Encouraged behavioral  activation and participation in group programming.     Medical diagnoses:  Per medicine    Disposition: IRTS on Monday 1/2/23       ATTESTATION    Wade Jaime MD  [facility]   Psychiatry    Video Visit: Patient has given verbal consent for video visit?: Yes  Type of Service: video visit for mental health treatment  Reason for Video Visit: COVID-19 [place]  Originating Site (patient location): [facility]  Distant Site (provider location): Remote Location  Mode of Communication: Video Conference via [employer]  Time of Service: Date: 12/31/2022 , Start:09:00 end: 09:30
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
## 2023-01-01 PROCEDURE — 250N000013 HC RX MED GY IP 250 OP 250 PS 637: Performed by: STUDENT IN AN ORGANIZED HEALTH CARE EDUCATION/TRAINING PROGRAM

## 2023-01-01 PROCEDURE — 124N000001 HC R&B MH

## 2023-01-01 PROCEDURE — 250N000013 HC RX MED GY IP 250 OP 250 PS 637: Performed by: NURSE PRACTITIONER

## 2023-01-01 PROCEDURE — 99239 HOSP IP/OBS DSCHRG MGMT >30: CPT | Mod: GT | Performed by: PSYCHIATRY & NEUROLOGY

## 2023-01-01 PROCEDURE — 250N000013 HC RX MED GY IP 250 OP 250 PS 637: Performed by: PSYCHIATRY & NEUROLOGY

## 2023-01-01 RX ORDER — GABAPENTIN 100 MG/1
100 CAPSULE ORAL 3 TIMES DAILY
Status: DISCONTINUED | OUTPATIENT
Start: 2023-01-01 | End: 2023-01-02 | Stop reason: HOSPADM

## 2023-01-01 RX ORDER — OLANZAPINE 10 MG/1
10 TABLET ORAL AT BEDTIME
Qty: 30 TABLET | Refills: 0 | Status: SHIPPED | OUTPATIENT
Start: 2023-01-01 | End: 2023-01-31

## 2023-01-01 RX ORDER — OLANZAPINE 5 MG/1
5 TABLET ORAL 2 TIMES DAILY PRN
Qty: 60 TABLET | Refills: 0 | Status: SHIPPED | OUTPATIENT
Start: 2023-01-01 | End: 2023-01-31

## 2023-01-01 RX ORDER — GABAPENTIN 100 MG/1
100 CAPSULE ORAL 3 TIMES DAILY
Qty: 90 CAPSULE | Refills: 0 | Status: SHIPPED | OUTPATIENT
Start: 2023-01-01 | End: 2023-01-31

## 2023-01-01 RX ADMIN — ACETAMINOPHEN 975 MG: 325 TABLET, FILM COATED ORAL at 11:03

## 2023-01-01 RX ADMIN — OLANZAPINE 10 MG: 10 TABLET, FILM COATED ORAL at 20:35

## 2023-01-01 RX ADMIN — GABAPENTIN 100 MG: 100 CAPSULE ORAL at 13:56

## 2023-01-01 RX ADMIN — IBUPROFEN 800 MG: 800 TABLET, FILM COATED ORAL at 08:16

## 2023-01-01 RX ADMIN — ACETAMINOPHEN 975 MG: 325 TABLET, FILM COATED ORAL at 17:26

## 2023-01-01 RX ADMIN — IBUPROFEN 800 MG: 800 TABLET, FILM COATED ORAL at 14:21

## 2023-01-01 RX ADMIN — GABAPENTIN 100 MG: 100 CAPSULE ORAL at 08:16

## 2023-01-01 RX ADMIN — GABAPENTIN 100 MG: 100 CAPSULE ORAL at 20:36

## 2023-01-01 RX ADMIN — IBUPROFEN 800 MG: 800 TABLET, FILM COATED ORAL at 20:36

## 2023-01-01 RX ADMIN — PROPRANOLOL HYDROCHLORIDE 20 MG: 20 TABLET ORAL at 20:35

## 2023-01-01 RX ADMIN — OLANZAPINE 5 MG: 5 TABLET, FILM COATED ORAL at 15:04

## 2023-01-01 ASSESSMENT — ACTIVITIES OF DAILY LIVING (ADL)
ADLS_ACUITY_SCORE: 31
LAUNDRY: UNABLE TO COMPLETE
HYGIENE/GROOMING: INDEPENDENT
ADLS_ACUITY_SCORE: 31
ORAL_HYGIENE: INDEPENDENT
ADLS_ACUITY_SCORE: 31
DRESS: SCRUBS (BEHAVIORAL HEALTH);INDEPENDENT
ADLS_ACUITY_SCORE: 31

## 2023-01-01 NOTE — PLAN OF CARE
"SHIFT SUMMARY:  Calm and cooperative. Sociable with peers and staff. Auditory hallucinations are present, but unintelligible. Experiences visual hallucinations \"out of the corner of my eyes.\"     Rated anxiety 7/10; Administered PRN PO gabapentin; Effective. Administered PRN PO ibuprofen 800 mg, per order,  related to 7/10 shoulder and back pain. Administered PRN PO tylenol 975 mg, per order, related to 6/10 left shoulder and back pain.    Care continues to NOC shift. Observed with even, unlabored respirations and frequent position changes. Appears to have slept 7 hours.          Problem: Adult Behavioral Health Plan of Care  Goal: Patient-Specific Goal (Individualization)  Description: Patient will attend at least 50% of unit programming.    Patient will be compliant with medications, assessments, and treatment team recommendation.   Patient will sleep at least 5 hours per NOC.  Outcome: Progressing     Problem: Suicidal Behavior  Goal: Suicidal Behavior is Absent or Managed  Outcome: Progressing   Goal Outcome Evaluation:                        "

## 2023-01-01 NOTE — PLAN OF CARE
"SHIFT SUMMARY:  Calm and cooperative.  Sociable with peers and staff. Watched the [employer] game in the lounge. Denies suicidal ideation and pain. Auditory hallucinations.    At 17:26, administered PRN PO acetaminophen 975 mg related to left shoulder and back pain. At 20:36, administered PRN PO ibuprofen 800 mg related to left shoulder and upper back pain. A pain rating of 5-6/10 after PRNs is \"normal\" and manageable.       Problem: Adult Behavioral Health Plan of Care  Goal: Patient-Specific Goal (Individualization)  Description: Patient will attend at least 50% of unit programming.    Patient will be compliant with medications, assessments, and treatment team recommendation.   Patient will sleep at least 5 hours per NOC.  Outcome: Progressing     Problem: Suicidal Behavior  Goal: Suicidal Behavior is Absent or Managed  Outcome: Progressing   Goal Outcome Evaluation:    Plan of Care Reviewed With: patient                   "

## 2023-01-01 NOTE — DISCHARGE SUMMARY
[facility]  DISCHARGE SUMMARY     DISCHARGE DATA     Shalom Castaneda MRN# [id number]   Age: 37 year old YOB: 1985     Date of Admission: 12/22/2022  Date of Discharge: January 2, 2023  Discharge Provider: Wade Jaime MD       REASON FOR ADMISSION   Shalom Castaneda is a 37 year old male, single,  ([place]) with a past psychiatric history notable for schizoaffective disorder. Currently living in [place], [place] with his mother, 2 dogs and 2 cats.  He is unemployed, last working 1 year ago at [employer].  Smoking 1/2 bowl of cannabis daily.  Off medications secondary to difficulties with refills. Self-presents to outside emergency department on a voluntary basis with worsening depressive symptoms, worsening command AVH instructing him to kill or harm himself. Patient was subsequently transferred and accepted for inpatient psychiatric hospitalization at [facility] for further safety and further stabilization        DISCHARGE DIAGNOSES   #Schizoaffective Disorder, depressed type  #cannabis use disorder, severe  #alcohol abuse versus use disorder     HOSPITAL COURSE   Patient was admitted to unit 5 due to the aforementioned presentation. The patient was placed under 15 minute checks to ensure patient safety. The patient participated in unit programming and groups as able.    Legal status during hospitalization was voluntary .Mr. Castaneda did not require seclusion/restraint during hospitalization.     We reviewed with Mr. Castaneda current and past medication trials including duration, dose, response and side effects. During this hospitalization, the following changes to the patient's psychotropic medications were made:    PTA medications held:   -cogentin     PTA medications continued/changed:   -olanzapine 10 mg at bedtime   -propranolol 20 mg at bedtime        New medications initiated:   -gabapentin 100 mg TID  (anxiety)     Mr. Castaneda progressed gradually related to response to medication changes, confidence in skills to manage symptoms and establishment of a supportive community network . By the time of discharge, his symptoms had improved significantly.  Depression improved with increased confidence in ability to manage symptoms., Anxiety improved with increased confidence in ability to manage symptoms., Suicidal ideation resolved with adequate outpatient plan in place.  and Psychotropic medications utilized were found to be helpful without significant adverse side effects. The treatment goals (long-term goal/discharge criteria) were reached.     With the aforementioned changes and supports the patient noticed improvement in their symptoms and felt sufficiently ready for discharge. As a result, Shalom Castaneda was discharged. At the time of discharge, Shalom Castaneda was determined to not be a danger to self or others. The patient was also medically stable for discharge. At the current time of discharge, the patient does not meet criteria for involuntary hospitalization. On the day of discharge, the patient reports that they do not have suicidal or homicidal ideation. Steps taken to minimize risk include: assessing patient s behavior and thought process daily during hospital stay, discharging patient with adequate plan for follow up for mental and physical health and discussing safety plan of returning to the hospital should the patient ever have thoughts of harming themselves or others. Therefore, based on all available evidence including the factors cited above, the patient does not appear to be at imminent risk for self-harm, and is appropriate for outpatient level of care. The acute crisis is resolved and Shalom is discharging in improved condition. Though Shalom's acute crisis has resolved, there remains a higher risk of suicide over the long term compared to the general population. Factors that may be associated  with relapse include worsening of depressive symptoms, alcohol use, illicit substance use, not taking medications, lack of mental health follow-up appointments and work/family/relationship stressors. Shalom Castaneda has a plan in place to mitigate these stressors, is hopeful about the future ,and is not assessed to be a imminent risk to self or anyone else and thus is not assessed to be holdable.  Shalom has received maximal benefit from the current hospital stay. Shalom Castaneda set to discharge.        DISCHARGE MEDICATIONS     Current Discharge Medication List      CONTINUE these medications which have CHANGED    Details   !! OLANZapine (ZYPREXA) 2.5 MG tablet Take 1 tablet (2.5 mg) by mouth 2 times daily as needed (take for hallucinations during the day)  Qty: 60 tablet, Refills: 0    Associated Diagnoses: Schizoaffective disorder, depressive type (H)      !! OLANZapine (ZYPREXA) 7.5 MG tablet Take 1 tablet (7.5 mg) by mouth At Bedtime for 30 days  Qty: 30 tablet, Refills: 1    Associated Diagnoses: Schizoaffective disorder, depressive type (H)      propranolol (INDERAL) 20 MG tablet Take 1 tablet (20 mg) by mouth At Bedtime for 30 days  Qty: 30 tablet, Refills: 0    Associated Diagnoses: Schizoaffective disorder, depressive type (H)       !! - Potential duplicate medications found. Please discuss with provider.      CONTINUE these medications which have NOT CHANGED    Details   medical cannabis (Patient's own supply) Take as directed by prescribing facility.         STOP taking these medications       benztropine (COGENTIN) 2 MG tablet Comments:   Reason for Stopping:         diphenhydrAMINE (BENADRYL) 50 MG capsule Comments:   Reason for Stopping:         ferrous sulfate (FE TABS) 325 (65 Fe) MG EC tablet Comments:   Reason for Stopping:         vitamin D3 (CHOLECALCIFEROL) 50 mcg (2000 units) tablet Comments:   Reason for Stopping:                  VITALS   Vitals: /86   Pulse 89   Temp 98.9  F (37.2  " C) (Temporal)   Resp 16   Ht 1.803 m (5' 11\")   Wt 78.7 kg (173 lb 6.4 oz)   SpO2 98%   BMI 24.18 kg/m       MENTAL STATUS EXAM   Appearance: Alert, oriented, dressed in hospital scrubs, appears stated age   Attitude: Cooperative   Eye Contact: Good  Mood: \"Better\"  Affect: Full range of affect  Speech: Normal rate and rhythm   Psychomotor Behavior: No tremor, rigidity, or psychomotor abnormality   Thought Process: Logical, goal directed   Associations: No loose associations   Thought Content: Denies SI or plan. No SIB.  No evidence of delusional thought. AVH lessened in intensity and frequency with addition of medications, lower than baseline upon dismissal.   Insight: Good  Judgment: Good  Oriented to: Person, place, and time  Attention Span and Concentration: Intact  Recent and Remote Memory: Intact  Language: English with appropriate syntax and vocabulary  Fund of Knowledge: Average  Muscle Strength and Tone: Grossly normal  Gait and Station: Grossly normal       DISCHARGE PLAN     1.  Education given regarding diagnostic and treatment options with risks, benefits and alternatives with adequate verbalization of understanding.  2.  Discharge to [facility]. Upon detailed review of risk factors, patient amenable for release.   3.  Continue aforementioned medications and associated medication changes with follow-up by outpatient provider.  4.  Crisis management planning in place.    5.  Nursing and  to review further discharge recommendations.   6.  Active issues: No active medical issues requiring immediate follow-up care.  7.  Patient is being discharged with the following appointments as detailed below:  [facility]  See AVS       DISCHARGE SERVICES PROVIDED     80 minutes spent on discharge services, including:  Final examination of patient.  Review and discussion of hospital stay.  Instructions for continued outpatient care/goals.  Preparation of discharge records.  Preparation of medications refills " and new prescriptions.  Preparation of applicable referral forms.        ATTESTATION   Wade Jaime MD  [facility]   Psychiatry    Video Visit: Patient has given verbal consent for video visit?: Yes  Type of Service: video visit for mental health treatment  Reason for Video Visit: COVID-19 [place]  Originating Site (patient location): [facility]  Distant Site (provider location): Remote Location  Mode of Communication: Video Conference via [email or web address]  Time of Service: Date: 1/1/23, Start:07:00 end: 08:00     LABS THIS ADMISSION     Results for orders placed or performed during the hospital encounter of 12/22/22   Comprehensive metabolic panel     Status: Abnormal   Result Value Ref Range    Sodium 141 136 - 145 mmol/L    Potassium 3.5 3.4 - 5.3 mmol/L    Chloride 103 98 - 107 mmol/L    Carbon Dioxide (CO2) 26 22 - 29 mmol/L    Anion Gap 12 7 - 15 mmol/L    Urea Nitrogen 10.9 6.0 - 20.0 mg/dL    Creatinine 1.22 (H) 0.67 - 1.17 mg/dL    Calcium 9.3 8.6 - 10.0 mg/dL    Glucose 95 70 - 99 mg/dL    Alkaline Phosphatase 95 40 - 129 U/L    AST 21 10 - 50 U/L    ALT 18 10 - 50 U/L    Protein Total 7.2 6.4 - 8.3 g/dL    Albumin 3.8 3.5 - 5.2 g/dL    Bilirubin Total 0.7 <=1.2 mg/dL    GFR Estimate 78 >60 mL/min/1.73m2   Extra Tube     Status: None    Narrative    The following orders were created for panel order Extra Tube.  Procedure                               Abnormality         Status                     ---------                               -----------         ------                     Extra Red Top Tube[[id number]]                               Final result               Extra Purple Top Tube[[id number]]                            Final result                 Please view results for these tests on the individual orders.   Extra Red Top Tube     Status: None   Result Value Ref Range    Hold Specimen JIC    Extra Purple Top Tube     Status: None   Result Value Ref Range    Hold  Specimen JIC

## 2023-01-01 NOTE — PLAN OF CARE
Face to face shift report received from RANDY Carlson.       Problem: Adult Behavioral Health Plan of Care  Goal: Patient-Specific Goal (Individualization)  Description: Patient will attend at least 50% of unit programming.    Patient will be compliant with medications, assessments, and treatment team recommendation.   Patient will sleep at least 5 hours per NOC.  Outcome: Progressing   Calm and cooperative. Soft spoken. Denies thoughts of harming self or others. Reports auditory hallucinations, but pt reports improvement to these. Withdrawn to room, but does come to lounge to make requests and eat meals. Pleasant during conversation.   0816: Gabapentin 100mg for anxiety and Ibuprofen 800mg for 6/10 neck/back pain.   1504: Requested and received Zyprexa 5mg PO for hallucinations.     Problem: Suicidal Behavior  Goal: Suicidal Behavior is Absent or Managed  Outcome: Progressing   Free from self harm or injury this shift.     Face to face end of shift report to be communicated to oncoming RN.

## 2023-01-02 VITALS
WEIGHT: 173.4 LBS | RESPIRATION RATE: 16 BRPM | HEART RATE: 72 BPM | SYSTOLIC BLOOD PRESSURE: 116 MMHG | DIASTOLIC BLOOD PRESSURE: 72 MMHG | BODY MASS INDEX: 24.27 KG/M2 | TEMPERATURE: 98.4 F | OXYGEN SATURATION: 98 % | HEIGHT: 71 IN

## 2023-01-02 PROCEDURE — 250N000013 HC RX MED GY IP 250 OP 250 PS 637: Performed by: PSYCHIATRY & NEUROLOGY

## 2023-01-02 RX ADMIN — IBUPROFEN 800 MG: 800 TABLET, FILM COATED ORAL at 08:28

## 2023-01-02 RX ADMIN — GABAPENTIN 100 MG: 100 CAPSULE ORAL at 08:28

## 2023-01-02 ASSESSMENT — ACTIVITIES OF DAILY LIVING (ADL)
ADLS_ACUITY_SCORE: 31
HYGIENE/GROOMING: INDEPENDENT
ADLS_ACUITY_SCORE: 31
DRESS: INDEPENDENT
ADLS_ACUITY_SCORE: 31
LAUNDRY: UNABLE TO COMPLETE
ORAL_HYGIENE: INDEPENDENT

## 2023-01-02 NOTE — PLAN OF CARE
Discharge Note    Patient Discharged to [facility] on 1/2/2023 11:23 AM [facility] [employer] accompanied by unit assistant.     Patient informed of discharge instructions in AVS. patient verbalizes understanding and denies having any questions pertaining to AVS. Patient stable at time of discharge. Patient denies SI, HI, and thoughts of self harm at time of discharge. All personal belongings returned to patient. Discharge prescriptions sent to [facility] via electronic communication. Psych evaluation, history and physical, AVS, and discharge summary faxed to next level of care- per .     Jeaneth Silverman RN  1/2/2023  11:44 AM

## 2023-01-02 NOTE — PLAN OF CARE
Pt is discharging at the recommendation of the treatment team. Pt is discharging to [facility] transported by [employer]. Pt denies having any thoughts of hurting themself or anyone else. Pt denies anxiety or depression. Pt has follow up with [facility] and PCP. Discharge instructions, including; demographic sheet, psychiatric evaluation, discharge summary, and AVS were faxed to these next level of care providers.

## 2023-01-02 NOTE — PLAN OF CARE
Face to face end of shift report received from Ree CARRERO RN. Rounding completed. Patient observed in [facility].     Pt has been calm, cooperative this shift. He is anticipating discharge. He denied any SI/HI and AH/VH. Reports chronic pain and received scheduled Gabapentin for this as well as Ibuprofen. Pt reports adequate results. Pt has attended groups throughout the morning. He has not had any falls this shift. He is able to make his needs known.       Problem: Adult Behavioral Health Plan of Care  Goal: Patient-Specific Goal (Individualization)  Description: Patient will attend at least 50% of unit programming.    Patient will be compliant with medications, assessments, and treatment team recommendation.   Patient will sleep at least 5 hours per NOC.  Outcome: Met     Problem: Suicidal Behavior  Goal: Suicidal Behavior is Absent or Managed  Outcome: Met    Jeaneth Silverman RN  1/2/2023  9:53 AM

## 2023-01-02 NOTE — PLAN OF CARE
Problem: Adult Behavioral Health Plan of Care  Goal: Patient-Specific Goal (Individualization)  Description: Patient will attend at least 50% of unit programming.    Patient will be compliant with medications, assessments, and treatment team recommendation.   Patient will sleep at least 5 hours per NOC.  Outcome: Progressing     Problem: Suicidal Behavior  Goal: Suicidal Behavior is Absent or Managed  Outcome: Progressing     Face to face shift report received from Aldo PADILLA. Rounding completed, pt observed.     Pt appeared to sleep most of this shift. Pt did not have any noted episodes of self harm this shift.    Face to face report will be communicated to oncoming RN.    Ree Hernandez RN  1/2/2023  6:06 AM